# Patient Record
Sex: MALE | Race: WHITE | NOT HISPANIC OR LATINO | Employment: FULL TIME | URBAN - METROPOLITAN AREA
[De-identification: names, ages, dates, MRNs, and addresses within clinical notes are randomized per-mention and may not be internally consistent; named-entity substitution may affect disease eponyms.]

---

## 2024-05-22 ENCOUNTER — TELEPHONE (OUTPATIENT)
Dept: BEHAVIORAL HEALTH | Facility: CLINIC | Age: 39
End: 2024-05-22

## 2024-05-22 ENCOUNTER — HOSPITAL ENCOUNTER (INPATIENT)
Facility: CLINIC | Age: 39
LOS: 2 days | Discharge: HOME | End: 2024-05-25
Attending: PSYCHIATRY & NEUROLOGY | Admitting: PSYCHIATRY & NEUROLOGY
Payer: COMMERCIAL

## 2024-05-22 DIAGNOSIS — F33.1 MAJOR DEPRESSIVE DISORDER, RECURRENT EPISODE, MODERATE (H): ICD-10-CM

## 2024-05-22 DIAGNOSIS — I15.9 SECONDARY HYPERTENSION: ICD-10-CM

## 2024-05-22 DIAGNOSIS — F10.929 ALCOHOLIC INTOXICATION WITH COMPLICATION (H): ICD-10-CM

## 2024-05-22 DIAGNOSIS — F10.90 ALCOHOL USE DISORDER: ICD-10-CM

## 2024-05-22 DIAGNOSIS — R45.851 SUICIDAL IDEATION: Primary | ICD-10-CM

## 2024-05-22 DIAGNOSIS — F41.9 ANXIETY: ICD-10-CM

## 2024-05-22 LAB
ALBUMIN SERPL BCG-MCNC: 4.9 G/DL (ref 3.5–5.2)
ALCOHOL BREATH TEST: 0.27 (ref 0–0.01)
ALP SERPL-CCNC: 98 U/L (ref 40–150)
ALT SERPL W P-5'-P-CCNC: 28 U/L (ref 0–70)
AMPHETAMINES UR QL SCN: NORMAL
ANION GAP SERPL CALCULATED.3IONS-SCNC: 29 MMOL/L (ref 7–15)
AST SERPL W P-5'-P-CCNC: 34 U/L (ref 0–45)
BARBITURATES UR QL SCN: NORMAL
BASOPHILS # BLD AUTO: 0 10E3/UL (ref 0–0.2)
BASOPHILS NFR BLD AUTO: 0 %
BENZODIAZ UR QL SCN: NORMAL
BILIRUB SERPL-MCNC: 1.3 MG/DL
BUN SERPL-MCNC: 15.5 MG/DL (ref 6–20)
BZE UR QL SCN: NORMAL
CALCIUM SERPL-MCNC: 8.6 MG/DL (ref 8.6–10)
CANNABINOIDS UR QL SCN: NORMAL
CHLORIDE SERPL-SCNC: 92 MMOL/L (ref 98–107)
CREAT SERPL-MCNC: 0.75 MG/DL (ref 0.67–1.17)
DEPRECATED HCO3 PLAS-SCNC: 16 MMOL/L (ref 22–29)
EGFRCR SERPLBLD CKD-EPI 2021: >90 ML/MIN/1.73M2
EOSINOPHIL # BLD AUTO: 0 10E3/UL (ref 0–0.7)
EOSINOPHIL NFR BLD AUTO: 0 %
ERYTHROCYTE [DISTWIDTH] IN BLOOD BY AUTOMATED COUNT: 14.2 % (ref 10–15)
FENTANYL UR QL: NORMAL
GLUCOSE SERPL-MCNC: 70 MG/DL (ref 70–99)
HCT VFR BLD AUTO: 48 % (ref 40–53)
HGB BLD-MCNC: 17.3 G/DL (ref 13.3–17.7)
IMM GRANULOCYTES # BLD: 0 10E3/UL
IMM GRANULOCYTES NFR BLD: 0 %
LYMPHOCYTES # BLD AUTO: 2.4 10E3/UL (ref 0.8–5.3)
LYMPHOCYTES NFR BLD AUTO: 26 %
MAGNESIUM SERPL-MCNC: 2.1 MG/DL (ref 1.7–2.3)
MCH RBC QN AUTO: 30 PG (ref 26.5–33)
MCHC RBC AUTO-ENTMCNC: 36 G/DL (ref 31.5–36.5)
MCV RBC AUTO: 83 FL (ref 78–100)
MONOCYTES # BLD AUTO: 0.5 10E3/UL (ref 0–1.3)
MONOCYTES NFR BLD AUTO: 6 %
NEUTROPHILS # BLD AUTO: 6.3 10E3/UL (ref 1.6–8.3)
NEUTROPHILS NFR BLD AUTO: 68 %
NRBC # BLD AUTO: 0 10E3/UL
NRBC BLD AUTO-RTO: 0 /100
OPIATES UR QL SCN: NORMAL
PCP QUAL URINE (ROCHE): NORMAL
PHOSPHATE SERPL-MCNC: 3.5 MG/DL (ref 2.5–4.5)
PLATELET # BLD AUTO: 263 10E3/UL (ref 150–450)
POTASSIUM SERPL-SCNC: 4.1 MMOL/L (ref 3.4–5.3)
PROT SERPL-MCNC: 8.4 G/DL (ref 6.4–8.3)
RBC # BLD AUTO: 5.77 10E6/UL (ref 4.4–5.9)
SARS-COV-2 RNA RESP QL NAA+PROBE: NEGATIVE
SODIUM SERPL-SCNC: 137 MMOL/L (ref 135–145)
WBC # BLD AUTO: 9.2 10E3/UL (ref 4–11)

## 2024-05-22 PROCEDURE — 83735 ASSAY OF MAGNESIUM: CPT | Performed by: PSYCHIATRY & NEUROLOGY

## 2024-05-22 PROCEDURE — 84100 ASSAY OF PHOSPHORUS: CPT | Performed by: PSYCHIATRY & NEUROLOGY

## 2024-05-22 PROCEDURE — 85025 COMPLETE CBC W/AUTO DIFF WBC: CPT | Performed by: PSYCHIATRY & NEUROLOGY

## 2024-05-22 PROCEDURE — HZ2ZZZZ DETOXIFICATION SERVICES FOR SUBSTANCE ABUSE TREATMENT: ICD-10-PCS | Performed by: NURSE PRACTITIONER

## 2024-05-22 PROCEDURE — 84460 ALANINE AMINO (ALT) (SGPT): CPT | Performed by: PSYCHIATRY & NEUROLOGY

## 2024-05-22 PROCEDURE — 250N000013 HC RX MED GY IP 250 OP 250 PS 637: Performed by: PSYCHIATRY & NEUROLOGY

## 2024-05-22 PROCEDURE — 80307 DRUG TEST PRSMV CHEM ANLYZR: CPT | Performed by: PSYCHIATRY & NEUROLOGY

## 2024-05-22 PROCEDURE — 82374 ASSAY BLOOD CARBON DIOXIDE: CPT | Performed by: PSYCHIATRY & NEUROLOGY

## 2024-05-22 PROCEDURE — 36415 COLL VENOUS BLD VENIPUNCTURE: CPT | Performed by: PSYCHIATRY & NEUROLOGY

## 2024-05-22 PROCEDURE — 82075 ASSAY OF BREATH ETHANOL: CPT | Performed by: PSYCHIATRY & NEUROLOGY

## 2024-05-22 PROCEDURE — 87635 SARS-COV-2 COVID-19 AMP PRB: CPT | Performed by: PSYCHIATRY & NEUROLOGY

## 2024-05-22 PROCEDURE — 99285 EMERGENCY DEPT VISIT HI MDM: CPT | Performed by: PSYCHIATRY & NEUROLOGY

## 2024-05-22 RX ORDER — MULTIPLE VITAMINS W/ MINERALS TAB 9MG-400MCG
1 TAB ORAL DAILY
Status: ON HOLD | COMMUNITY
End: 2024-05-24

## 2024-05-22 RX ORDER — LANOLIN ALCOHOL/MO/W.PET/CERES
100 CREAM (GRAM) TOPICAL DAILY
Status: ON HOLD | COMMUNITY
End: 2024-05-24

## 2024-05-22 RX ORDER — ARIPIPRAZOLE 2 MG/1
2 TABLET ORAL DAILY
Status: DISCONTINUED | OUTPATIENT
Start: 2024-05-22 | End: 2024-05-23

## 2024-05-22 RX ORDER — GABAPENTIN 300 MG/1
300 CAPSULE ORAL 3 TIMES DAILY PRN
Status: DISCONTINUED | OUTPATIENT
Start: 2024-05-22 | End: 2024-05-25 | Stop reason: HOSPADM

## 2024-05-22 RX ORDER — ATENOLOL 50 MG/1
50 TABLET ORAL DAILY PRN
Status: DISCONTINUED | OUTPATIENT
Start: 2024-05-22 | End: 2024-05-23

## 2024-05-22 RX ORDER — LISINOPRIL 5 MG/1
15 TABLET ORAL DAILY
Status: ON HOLD | COMMUNITY
Start: 2024-02-16 | End: 2024-05-24

## 2024-05-22 RX ORDER — FOLIC ACID 1 MG/1
1 TABLET ORAL DAILY
Status: DISCONTINUED | OUTPATIENT
Start: 2024-05-22 | End: 2024-05-23

## 2024-05-22 RX ORDER — SERTRALINE HYDROCHLORIDE 25 MG/1
25 TABLET, FILM COATED ORAL DAILY
Status: DISCONTINUED | OUTPATIENT
Start: 2024-05-22 | End: 2024-05-25 | Stop reason: HOSPADM

## 2024-05-22 RX ORDER — ACETAMINOPHEN 325 MG/1
650 TABLET ORAL EVERY 8 HOURS PRN
Status: DISCONTINUED | OUTPATIENT
Start: 2024-05-22 | End: 2024-05-23

## 2024-05-22 RX ORDER — MULTIPLE VITAMINS W/ MINERALS TAB 9MG-400MCG
1 TAB ORAL DAILY
Status: DISCONTINUED | OUTPATIENT
Start: 2024-05-22 | End: 2024-05-23

## 2024-05-22 RX ORDER — SERTRALINE HYDROCHLORIDE 25 MG/1
25 TABLET, FILM COATED ORAL DAILY
Status: ON HOLD | COMMUNITY
Start: 2024-02-16 | End: 2024-05-24

## 2024-05-22 RX ORDER — LANOLIN ALCOHOL/MO/W.PET/CERES
3 CREAM (GRAM) TOPICAL
Status: DISCONTINUED | OUTPATIENT
Start: 2024-05-22 | End: 2024-05-25 | Stop reason: HOSPADM

## 2024-05-22 RX ORDER — ARIPIPRAZOLE 2 MG/1
1 TABLET ORAL DAILY
Status: ON HOLD | COMMUNITY
Start: 2024-02-16 | End: 2024-05-24

## 2024-05-22 RX ORDER — LANOLIN ALCOHOL/MO/W.PET/CERES
6 CREAM (GRAM) TOPICAL AT BEDTIME
COMMUNITY
Start: 2024-02-16

## 2024-05-22 RX ORDER — HYDROXYZINE PAMOATE 50 MG/1
50 CAPSULE ORAL 3 TIMES DAILY PRN
Status: ON HOLD | COMMUNITY
Start: 2024-02-16 | End: 2024-05-24

## 2024-05-22 RX ORDER — HYDROXYZINE HYDROCHLORIDE 25 MG/1
50 TABLET, FILM COATED ORAL 3 TIMES DAILY PRN
Status: DISCONTINUED | OUTPATIENT
Start: 2024-05-22 | End: 2024-05-23

## 2024-05-22 RX ORDER — LORAZEPAM 1 MG/1
1-4 TABLET ORAL EVERY 30 MIN PRN
Status: DISCONTINUED | OUTPATIENT
Start: 2024-05-22 | End: 2024-05-23

## 2024-05-22 RX ADMIN — LORAZEPAM 2 MG: 1 TABLET ORAL at 17:14

## 2024-05-22 RX ADMIN — LORAZEPAM 2 MG: 1 TABLET ORAL at 19:11

## 2024-05-22 RX ADMIN — SERTRALINE HYDROCHLORIDE 25 MG: 25 TABLET ORAL at 17:59

## 2024-05-22 RX ADMIN — THIAMINE HCL TAB 100 MG 100 MG: 100 TAB at 17:59

## 2024-05-22 RX ADMIN — LISINOPRIL 15 MG: 10 TABLET ORAL at 17:59

## 2024-05-22 RX ADMIN — LORAZEPAM 2 MG: 1 TABLET ORAL at 21:12

## 2024-05-22 RX ADMIN — Medication 1 TABLET: at 17:59

## 2024-05-22 RX ADMIN — ARIPIPRAZOLE 2 MG: 2 TABLET ORAL at 17:59

## 2024-05-22 RX ADMIN — NICOTINE POLACRILEX 4 MG: 4 GUM, CHEWING BUCCAL at 18:21

## 2024-05-22 RX ADMIN — FOLIC ACID 1 MG: 1 TABLET ORAL at 17:59

## 2024-05-22 RX ADMIN — ATENOLOL 50 MG: 50 TABLET ORAL at 20:06

## 2024-05-22 ASSESSMENT — COLUMBIA-SUICIDE SEVERITY RATING SCALE - C-SSRS
3. HAVE YOU BEEN THINKING ABOUT HOW YOU MIGHT KILL YOURSELF?: NO
4. HAVE YOU HAD THESE THOUGHTS AND HAD SOME INTENTION OF ACTING ON THEM?: NO
5. HAVE YOU STARTED TO WORK OUT OR WORKED OUT THE DETAILS OF HOW TO KILL YOURSELF? DO YOU INTEND TO CARRY OUT THIS PLAN?: NO
2. HAVE YOU ACTUALLY HAD ANY THOUGHTS OF KILLING YOURSELF IN THE PAST MONTH?: YES
1. IN THE PAST MONTH, HAVE YOU WISHED YOU WERE DEAD OR WISHED YOU COULD GO TO SLEEP AND NOT WAKE UP?: YES
6. HAVE YOU EVER DONE ANYTHING, STARTED TO DO ANYTHING, OR PREPARED TO DO ANYTHING TO END YOUR LIFE?: YES

## 2024-05-22 ASSESSMENT — ACTIVITIES OF DAILY LIVING (ADL)
ADLS_ACUITY_SCORE: 35

## 2024-05-22 NOTE — TELEPHONE ENCOUNTER
S: [facility] , Provider Sav  calling at 5:25 PM  with clinical on a 39 year old/Male presenting for alcohol detox.     B: Pt presents for ETOH detox.   Currently reports drinking 1 liter of vodka daily  for  a Week .    Patient reports last use was 6 hours ago .  Pt JAMES: .27  Pt  denies hx of DT  Pt  denies hx of seizures. Last seizure: N/A  Pt endorsing the following symptoms of withdrawal: Anxious and Nausea  MSSA Score: Unknown     Pt denies acute mental health or medical concerns.   Pt denies other drug use: None Amount/frequency: N/A    Does Pt have a detox care plan in [facility]? No   Does pt present with specific needs, assistive devices, or exclusionary criteria? None  Is the patient ambulating, eating and drinking in the ED? Yes     A: Pt meets criteria to be presented for IP detox admission. Patient is voluntary    COVID Symptoms: No  If yes, COVID test required   Utox: Negative  Magnesium: WNL  CMP: WNL  CBC: WNL  HCG: N/A     R: Patient cleared and ready for behavioral bed placement: Yes    Pt is meeting criteria for presentation to 3A/CD    Does Patient need a Transfer Center request created? No, Pt is located within [facility] ED, [facility] ED, or [place] [facility]

## 2024-05-22 NOTE — PHARMACY-ADMISSION MEDICATION HISTORY
Admission Medication History Completed by Pharmacy    See [facility] Admission Navigator for allergy information, preferred outpatient pharmacy and prior to admission medications.     Medication History Sources:   2/16/2024 Telemedicine note from [facility]     Additional Information:  PTA medication list updated based on 2/16/2024 telemedicine note and patient interview. All medication directions consistent with fill records and all medications refilled in February for 90-day supply.   Patient states he has not taken any of his medications for the past few months.    Prior to Admission medications    Medication Sig Last Dose       ARIPiprazole (ABILIFY) 2 MG tablet     Take 1 tablet by mouth daily More than a month       hydrOXYzine (VISTARIL) 50 MG capsule Take 50 mg by mouth 3 times daily as needed for anxiety     More than a month       lisinopril (ZESTRIL) 5 MG tablet Take 15 mg by mouth daily     More than a month       melatonin 3 MG tablet Take 6 mg by mouth at bedtime     More than a month       multivitamin w/minerals (THERA-VIT-M) tablet     Take 1 tablet by mouth daily More than a month       nicotine polacrilex (NICORETTE) 4 MG gum Take 4 mg by mouth every hour as needed     More than a month       sertraline (ZOLOFT) 25 MG tablet Take 25 mg by mouth daily     More than a month       thiamine (B-1) 100 MG tablet Take 100 mg by mouth daily More than a month             Date completed: 05/22/24    Medication history completed by:   Meaghan Amin, Pharm.D., [facility]  Behavioral Health Inpatient Pharmacist  [facility] ([facility]) Emergency Department  Contact via Vocera or Teams

## 2024-05-22 NOTE — PROGRESS NOTES
RN ED Mental Health Handoff Note    Patient pleasant and cooperative upon approach. Pt stated he wants detox.     Voluntary    Does patient require 1:1? No    Hold and rights been given and documented for patient: Yes    Is the patient in  scrubs? Yes    Has the patient been searched? Yes    Is the 15 minute observation tool up to date? Yes    Room check completed this shift: No    PSS3 and [place] Assessment/Reassessment this shift:    C-SSRS ([place])      Date and Time Q1 Wished to be Dead (Past Month) Q2 Suicidal Thoughts (Past Month) Q3 Suicidal Thought Method Q4 Suicidal Intent without Specific Plan Q5 Suicide Intent with Specific Plan Q6 Suicide Behavior (Lifetime) Within the Past 3 Months? Level of Risk per Screen Level of Risk per Screen User   05/22/24 1721 1-->yes 1-->yes 0-->no 0-->no 0-->no 0-->no -- -- low risk RC   05/22/24 1602 -- -- 0-->no 0-->no 0-->no -- 0-->no -- moderate risk RC   05/22/24 1513 1-->yes 1-->yes 0-->no 0-->no 0-->no 1-->yes 0-->no -- moderate risk RC          Code 21 called this shift? No    Use of restraints/seclusion this shift? No    Most recent vital signs:  Temp: 98.9  F (37.2  C) Temp src: Oral BP: (!) 149/105 Pulse: 105   Resp: 18 SpO2: 94 % O2 Device: None (Room air)      Medications:  Scheduled medication compliance? Yes    PRN Meds administered this shift? Yes    Medications   nicotine polacrilex (NICORETTE) gum 4 mg (4 mg Buccal $Given 5/22/24 1821)   sertraline (ZOLOFT) tablet 25 mg (25 mg Oral $Given 5/22/24 1759)   ARIPiprazole (ABILIFY) tablet 2 mg (2 mg Oral $Given 5/22/24 1759)   hydrOXYzine HCl (ATARAX) tablet 50 mg (has no administration in time range)   melatonin tablet 3 mg (has no administration in time range)   lisinopril (ZESTRIL) tablet 15 mg (15 mg Oral $Given 5/22/24 1759)   gabapentin (NEURONTIN) capsule 300 mg (has no administration in time range)   acetaminophen (TYLENOL) tablet 650 mg (has no administration in time range)   LORazepam (ATIVAN)  tablet 1-4 mg (2 mg Oral $Given 5/22/24 7131)   atenolol (TENORMIN) tablet 50 mg (has no administration in time range)   thiamine (B-1) tablet 100 mg (100 mg Oral $Given 5/22/24 1759)   folic acid (FOLVITE) tablet 1 mg (1 mg Oral $Given 5/22/24 1759)   multivitamin w/minerals (THERA-VIT-M) tablet 1 tablet (1 tablet Oral $Given 5/22/24 1759)     ADLs    Meal Provided this shift? Yes    Hygiene items provided? Yes    ADLs completed? Yes      Any significant events this shift? No    Any information that would be helpful in caring for this patient?  NA    Family present/updated? No    Location of patient's belongings: with patient    Critical Care Minutes:  Does the patient need critical care minutes documented? No

## 2024-05-22 NOTE — ED PROVIDER NOTES
"ED Provider Note  [facility]      History     Chief Complaint   Patient presents with    Suicidal     Pt stated he is feeling suicidal. Pt stated he does not have any plans. Pt stated that he drinks over 1 L daily. Pt stated he does not feel he good be safe outside of the hospital.     HPI  Raghavendra Loredo is a 39 year old male with a history of borderline personality disorder, CASSIUS, MDD, and SI presents to the emergency department for SI without a plan. Patient reports relapsing on alcohol past week and been drinking 1 L vodka daily with last drink 6 hours prior to arrival. Concerned friend called 911 as patient was feeling depressed and suicidal. Here he feels the alcohol is making his depression worse and that he no longer is suicidal as he samanta up. He is interested in detox. He was last in detox and treatment 6 months ago. He managed to stay sober until past week. He denies using other drugs. He is prescribed psychotropics for his depression and anxiety but has not been taking them since his week long alcohol binge.    Patient denies acute medical concerns. He denies history of withdrawal seizures or DTs.    Past Medical History  No past medical history on file.  No past surgical history on file.  ARIPiprazole (ABILIFY) 2 MG tablet  hydrOXYzine (VISTARIL) 50 MG capsule  lisinopril (ZESTRIL) 5 MG tablet  melatonin 3 MG tablet  nicotine polacrilex (NICORETTE) 4 MG gum  sertraline (ZOLOFT) 25 MG tablet      Allergies   Allergen Reactions    Shiitake Mushroom Hives     Family History  No family history on file.  Social History          A medically appropriate review of systems was performed with pertinent positives and negatives noted in the HPI, and all other systems negative.    Physical Exam   BP: (!) 149/105  Pulse: 105  Temp: 98.9  F (37.2  C)  Resp: 18  Height: 172.7 cm (5' 8\")  SpO2: 94 %  Physical Exam  Vitals and nursing note reviewed.   HENT:      Head: Normocephalic.   Eyes:      " Pupils: Pupils are equal, round, and reactive to light.   Pulmonary:      Effort: Pulmonary effort is normal.   Musculoskeletal:         General: Normal range of motion.      Cervical back: Normal range of motion.   Neurological:      General: No focal deficit present.      Mental Status: He is alert.   Psychiatric:         Attention and Perception: Attention and perception normal.         Mood and Affect: Mood and affect normal.         Speech: Speech normal.         Behavior: Behavior normal. Behavior is not agitated, aggressive, hyperactive or combative. Behavior is cooperative.         Thought Content: Thought content normal. Thought content is not paranoid or delusional. Thought content does not include homicidal or suicidal ideation.         Cognition and Memory: Cognition and memory normal.         Judgment: Judgment normal.           ED Course, Procedures, & Data      Procedures       10 minutes spent reviewing prior records and intervention.     Results for orders placed or performed during the hospital encounter of 05/22/24   Comprehensive metabolic panel     Status: Abnormal   Result Value Ref Range    Sodium 137 135 - 145 mmol/L    Potassium 4.1 3.4 - 5.3 mmol/L    Carbon Dioxide (CO2) 16 (L) 22 - 29 mmol/L    Anion Gap 29 (H) 7 - 15 mmol/L    Urea Nitrogen 15.5 6.0 - 20.0 mg/dL    Creatinine 0.75 0.67 - 1.17 mg/dL    GFR Estimate >90 >60 mL/min/1.73m2    Calcium 8.6 8.6 - 10.0 mg/dL    Chloride 92 (L) 98 - 107 mmol/L    Glucose 70 70 - 99 mg/dL    Alkaline Phosphatase 98 40 - 150 U/L    AST 34 0 - 45 U/L    ALT 28 0 - 70 U/L    Protein Total 8.4 (H) 6.4 - 8.3 g/dL    Albumin 4.9 3.5 - 5.2 g/dL    Bilirubin Total 1.3 (H) <=1.2 mg/dL   Magnesium     Status: Normal   Result Value Ref Range    Magnesium 2.1 1.7 - 2.3 mg/dL   Phosphorus     Status: Normal   Result Value Ref Range    Phosphorus 3.5 2.5 - 4.5 mg/dL   CBC with platelets and differential     Status: None   Result Value Ref Range    WBC Count 9.2  4.0 - 11.0 10e3/uL    RBC Count 5.77 4.40 - 5.90 10e6/uL    Hemoglobin 17.3 13.3 - 17.7 g/dL    Hematocrit 48.0 40.0 - 53.0 %    MCV 83 78 - 100 fL    MCH 30.0 26.5 - 33.0 pg    MCHC 36.0 31.5 - 36.5 g/dL    RDW 14.2 10.0 - 15.0 %    Platelet Count 263 150 - 450 10e3/uL    % Neutrophils 68 %    % Lymphocytes 26 %    % Monocytes 6 %    % Eosinophils 0 %    % Basophils 0 %    % Immature Granulocytes 0 %    NRBCs per 100 WBC 0 <1 /100    Absolute Neutrophils 6.3 1.6 - 8.3 10e3/uL    Absolute Lymphocytes 2.4 0.8 - 5.3 10e3/uL    Absolute Monocytes 0.5 0.0 - 1.3 10e3/uL    Absolute Eosinophils 0.0 0.0 - 0.7 10e3/uL    Absolute Basophils 0.0 0.0 - 0.2 10e3/uL    Absolute Immature Granulocytes 0.0 <=0.4 10e3/uL    Absolute NRBCs 0.0 10e3/uL   Urine Drug Screen Panel     Status: Normal   Result Value Ref Range    Amphetamines Urine Screen Negative Screen Negative    Barbituates Urine Screen Negative Screen Negative    Benzodiazepine Urine Screen Negative Screen Negative    Cannabinoids Urine Screen Negative Screen Negative    Cocaine Urine Screen Negative Screen Negative    Fentanyl Qual Urine Screen Negative Screen Negative    Opiates Urine Screen Negative Screen Negative    PCP Urine Screen Negative Screen Negative   Alcohol breath test POCT     Status: Abnormal   Result Value Ref Range    Alcohol Breath Test 0.269 (A) 0.00 - 0.01   CBC with platelets differential     Status: None    Narrative    The following orders were created for panel order CBC with platelets differential.  Procedure                               Abnormality         Status                     ---------                               -----------         ------                     CBC with platelets and d...[[id number]]                      Final result                 Please view results for these tests on the individual orders.   Urine Drug Screen     Status: Normal    Narrative    The following orders were created for panel order Urine Drug  Screen.  Procedure                               Abnormality         Status                     ---------                               -----------         ------                     Urine Drug Screen Panel[[id number]]      Normal              Final result                 Please view results for these tests on the individual orders.     Medications   nicotine polacrilex (NICORETTE) gum 4 mg (has no administration in time range)   sertraline (ZOLOFT) tablet 25 mg (has no administration in time range)   ARIPiprazole (ABILIFY) tablet 2 mg (has no administration in time range)   hydrOXYzine drew (VISTARIL) capsule 50 mg (has no administration in time range)   melatonin tablet 3 mg (has no administration in time range)   lisinopril (ZESTRIL) tablet 15 mg (has no administration in time range)   gabapentin (NEURONTIN) capsule 300 mg (has no administration in time range)   acetaminophen (TYLENOL) tablet 650 mg (has no administration in time range)   LORazepam (ATIVAN) tablet 1-4 mg (has no administration in time range)   atenolol (TENORMIN) tablet 50 mg (has no administration in time range)   thiamine (B-1) tablet 100 mg (has no administration in time range)   folic acid (FOLVITE) tablet 1 mg (has no administration in time range)   multivitamin w/minerals (THERA-VIT-M) tablet 1 tablet (has no administration in time range)     Labs Ordered and Resulted from Time of ED Arrival to Time of ED Departure   COMPREHENSIVE METABOLIC PANEL - Abnormal       Result Value    Sodium 137      Potassium 4.1      Carbon Dioxide (CO2) 16 (*)     Anion Gap 29 (*)     Urea Nitrogen 15.5      Creatinine 0.75      GFR Estimate >90      Calcium 8.6      Chloride 92 (*)     Glucose 70      Alkaline Phosphatase 98      AST 34      ALT 28      Protein Total 8.4 (*)     Albumin 4.9      Bilirubin Total 1.3 (*)    ALCOHOL BREATH TEST POCT - Abnormal    Alcohol Breath Test 0.269 (*)    MAGNESIUM - Normal    Magnesium 2.1     PHOSPHORUS - Normal     Phosphorus 3.5     URINE DRUG SCREEN PANEL - Normal    Amphetamines Urine Screen Negative      Barbituates Urine Screen Negative      Benzodiazepine Urine Screen Negative      Cannabinoids Urine Screen Negative      Cocaine Urine Screen Negative      Fentanyl Qual Urine Screen Negative      Opiates Urine Screen Negative      PCP Urine Screen Negative     CBC WITH PLATELETS AND DIFFERENTIAL    WBC Count 9.2      RBC Count 5.77      Hemoglobin 17.3      Hematocrit 48.0      MCV 83      MCH 30.0      MCHC 36.0      RDW 14.2      Platelet Count 263      % Neutrophils 68      % Lymphocytes 26      % Monocytes 6      % Eosinophils 0      % Basophils 0      % Immature Granulocytes 0      NRBCs per 100 WBC 0      Absolute Neutrophils 6.3      Absolute Lymphocytes 2.4      Absolute Monocytes 0.5      Absolute Eosinophils 0.0      Absolute Basophils 0.0      Absolute Immature Granulocytes 0.0      Absolute NRBCs 0.0       No orders to display          Critical care was not performed.     Medical Decision Making  The patient's presentation was of high complexity (a chronic illness severe exacerbation, progression, or side effect of treatment).    The patient's evaluation involved:  review of external note(s) from 2 sources (see separate area of note for details)  review of 2 test result(s) ordered prior to this encounter (see separate area of note for details)  ordering and/or review of 3+ test(s) in this encounter (see separate area of note for details)    The patient's management necessitated high risk (a decision regarding hospitalization).    Assessment & Plan    Patient is here via EMS from his apartment as he told a friend about feeling depressed and suicidal. Patient has been drinking 1 L vodka daily past week and admits that it has made him more depressed. He no longer feels suicidal as he is sobering up. He feels he is starting to go into withdrawal and would like to get into detox. Patient is medically cleared. Labs  are nonconcerning. Patient is restarted on his prescribed meds and gabapentin is added to help with early withdrawal. MSSA was ordered.     I have reviewed the nursing notes. I have reviewed the findings, diagnosis, plan and need for follow up with the patient.    New Prescriptions    No medications on file       Final diagnoses:   Alcoholic intoxication with complication (H24)   Major depressive disorder, recurrent episode, moderate (H)       Tigre Ang MD  [facility] EMERGENCY DEPARTMENT  5/22/2024     Tigre Ang MD  05/22/24 2664

## 2024-05-22 NOTE — ED TRIAGE NOTES
Pt stated he is feeling suicidal. Pt stated he does not have any plans. Pt stated that he drinks over 1 L daily. Pt stated he does not feel he good be safe outside of the hospital. Pt states he wants detox.     Triage Assessment (Adult)       Row Name 05/22/24 1512          Triage Assessment    Airway WDL WDL        Respiratory WDL    Respiratory WDL WDL        Skin Circulation/Temperature WDL    Skin Circulation/Temperature WDL WDL        Cardiac WDL    Cardiac WDL WDL        Peripheral/Neurovascular WDL    Peripheral Neurovascular WDL WDL        Cognitive/Neuro/Behavioral WDL    Cognitive/Neuro/Behavioral WDL X;mood/behavior;motor response     Level of Consciousness alert     Arousal Level opens eyes spontaneously     Orientation oriented x 4     Speech clear     Mood/Behavior anxious;flat affect;hypoactive (quiet, withdrawn);sad;cooperative        Pupils (CN II)    Pupil PERRLA yes        Allie Coma Scale    Best Eye Response 4-->(E4) spontaneous     Best Motor Response 6-->(M6) obeys commands     Best Verbal Response 5-->(V5) oriented     Allie Coma Scale Score 15        Motor Response    Motor Response general motor response     General Motor Response tremors

## 2024-05-22 NOTE — TELEPHONE ENCOUNTER
R: 5:55 PM Dr Hoff accepts pt for 3A/[place]    Pt added to unit queue, unit called with disposition    ED updated with pt placement.

## 2024-05-23 PROBLEM — F10.90 ALCOHOL USE DISORDER: Status: ACTIVE | Noted: 2024-05-23

## 2024-05-23 LAB
ANION GAP SERPL CALCULATED.3IONS-SCNC: 14 MMOL/L (ref 7–15)
BUN SERPL-MCNC: 21.7 MG/DL (ref 6–20)
CALCIUM SERPL-MCNC: 8.8 MG/DL (ref 8.6–10)
CHLORIDE SERPL-SCNC: 96 MMOL/L (ref 98–107)
CHOLEST SERPL-MCNC: 262 MG/DL
CREAT SERPL-MCNC: 0.75 MG/DL (ref 0.67–1.17)
DEPRECATED HCO3 PLAS-SCNC: 25 MMOL/L (ref 22–29)
EGFRCR SERPLBLD CKD-EPI 2021: >90 ML/MIN/1.73M2
GGT SERPL-CCNC: 32 U/L (ref 8–61)
GLUCOSE SERPL-MCNC: 115 MG/DL (ref 70–99)
HDLC SERPL-MCNC: 59 MG/DL
LDLC SERPL CALC-MCNC: 167 MG/DL
NONHDLC SERPL-MCNC: 203 MG/DL
POTASSIUM SERPL-SCNC: 4.1 MMOL/L (ref 3.4–5.3)
SODIUM SERPL-SCNC: 135 MMOL/L (ref 135–145)
TRIGL SERPL-MCNC: 179 MG/DL
TSH SERPL DL<=0.005 MIU/L-ACNC: 3.12 UIU/ML (ref 0.3–4.2)

## 2024-05-23 PROCEDURE — 250N000013 HC RX MED GY IP 250 OP 250 PS 637: Performed by: NURSE PRACTITIONER

## 2024-05-23 PROCEDURE — 99223 1ST HOSP IP/OBS HIGH 75: CPT | Mod: AI | Performed by: NURSE PRACTITIONER

## 2024-05-23 PROCEDURE — 250N000013 HC RX MED GY IP 250 OP 250 PS 637: Performed by: PSYCHIATRY & NEUROLOGY

## 2024-05-23 PROCEDURE — 84443 ASSAY THYROID STIM HORMONE: CPT | Performed by: PSYCHIATRY & NEUROLOGY

## 2024-05-23 PROCEDURE — 80048 BASIC METABOLIC PNL TOTAL CA: CPT | Performed by: PHYSICIAN ASSISTANT

## 2024-05-23 PROCEDURE — 99253 IP/OBS CNSLTJ NEW/EST LOW 45: CPT | Performed by: PHYSICIAN ASSISTANT

## 2024-05-23 PROCEDURE — 82465 ASSAY BLD/SERUM CHOLESTEROL: CPT | Performed by: PSYCHIATRY & NEUROLOGY

## 2024-05-23 PROCEDURE — 82977 ASSAY OF GGT: CPT | Performed by: PSYCHIATRY & NEUROLOGY

## 2024-05-23 PROCEDURE — 128N000004 HC R&B CD ADULT

## 2024-05-23 PROCEDURE — 36415 COLL VENOUS BLD VENIPUNCTURE: CPT | Performed by: PSYCHIATRY & NEUROLOGY

## 2024-05-23 RX ORDER — HYDROXYZINE HYDROCHLORIDE 25 MG/1
25 TABLET, FILM COATED ORAL EVERY 4 HOURS PRN
Status: DISCONTINUED | OUTPATIENT
Start: 2024-05-23 | End: 2024-05-23

## 2024-05-23 RX ORDER — SENNOSIDES 8.6 MG
8.6 TABLET ORAL 2 TIMES DAILY PRN
Status: DISCONTINUED | OUTPATIENT
Start: 2024-05-23 | End: 2024-05-23

## 2024-05-23 RX ORDER — ONDANSETRON 4 MG/1
4 TABLET, ORALLY DISINTEGRATING ORAL EVERY 6 HOURS PRN
Status: DISCONTINUED | OUTPATIENT
Start: 2024-05-23 | End: 2024-05-25 | Stop reason: HOSPADM

## 2024-05-23 RX ORDER — ATENOLOL 50 MG/1
50 TABLET ORAL DAILY PRN
Status: DISCONTINUED | OUTPATIENT
Start: 2024-05-23 | End: 2024-05-25 | Stop reason: HOSPADM

## 2024-05-23 RX ORDER — HYDROXYZINE HYDROCHLORIDE 50 MG/1
50 TABLET, FILM COATED ORAL 3 TIMES DAILY PRN
Status: DISCONTINUED | OUTPATIENT
Start: 2024-05-23 | End: 2024-05-25 | Stop reason: HOSPADM

## 2024-05-23 RX ORDER — AMOXICILLIN 250 MG
1 CAPSULE ORAL 2 TIMES DAILY PRN
Status: DISCONTINUED | OUTPATIENT
Start: 2024-05-23 | End: 2024-05-25 | Stop reason: HOSPADM

## 2024-05-23 RX ORDER — ACETAMINOPHEN 325 MG/1
650 TABLET ORAL EVERY 4 HOURS PRN
Status: DISCONTINUED | OUTPATIENT
Start: 2024-05-23 | End: 2024-05-25 | Stop reason: HOSPADM

## 2024-05-23 RX ORDER — MULTIPLE VITAMINS W/ MINERALS TAB 9MG-400MCG
1 TAB ORAL DAILY
Status: DISCONTINUED | OUTPATIENT
Start: 2024-05-23 | End: 2024-05-25 | Stop reason: HOSPADM

## 2024-05-23 RX ORDER — TRAZODONE HYDROCHLORIDE 50 MG/1
50 TABLET, FILM COATED ORAL
Status: DISCONTINUED | OUTPATIENT
Start: 2024-05-23 | End: 2024-05-25 | Stop reason: HOSPADM

## 2024-05-23 RX ORDER — ARIPIPRAZOLE 2 MG/1
2 TABLET ORAL DAILY
Status: DISCONTINUED | OUTPATIENT
Start: 2024-05-23 | End: 2024-05-25 | Stop reason: HOSPADM

## 2024-05-23 RX ORDER — FOLIC ACID 1 MG/1
1 TABLET ORAL DAILY
Status: DISCONTINUED | OUTPATIENT
Start: 2024-05-23 | End: 2024-05-25 | Stop reason: HOSPADM

## 2024-05-23 RX ORDER — MAGNESIUM HYDROXIDE/ALUMINUM HYDROXICE/SIMETHICONE 120; 1200; 1200 MG/30ML; MG/30ML; MG/30ML
30 SUSPENSION ORAL EVERY 4 HOURS PRN
Status: DISCONTINUED | OUTPATIENT
Start: 2024-05-23 | End: 2024-05-25 | Stop reason: HOSPADM

## 2024-05-23 RX ORDER — DIAZEPAM 5 MG
5-20 TABLET ORAL EVERY 30 MIN PRN
Status: DISCONTINUED | OUTPATIENT
Start: 2024-05-23 | End: 2024-05-25 | Stop reason: HOSPADM

## 2024-05-23 RX ADMIN — NICOTINE POLACRILEX 4 MG: 4 GUM, CHEWING BUCCAL at 12:43

## 2024-05-23 RX ADMIN — THIAMINE HCL TAB 100 MG 100 MG: 100 TAB at 08:47

## 2024-05-23 RX ADMIN — NICOTINE POLACRILEX 4 MG: 4 GUM, CHEWING BUCCAL at 16:28

## 2024-05-23 RX ADMIN — LORAZEPAM 2 MG: 1 TABLET ORAL at 01:30

## 2024-05-23 RX ADMIN — NICOTINE POLACRILEX 4 MG: 4 GUM, CHEWING BUCCAL at 09:21

## 2024-05-23 RX ADMIN — Medication 5 MG: at 21:26

## 2024-05-23 RX ADMIN — Medication 3 MG: at 01:30

## 2024-05-23 RX ADMIN — ARIPIPRAZOLE 2 MG: 2 TABLET ORAL at 08:44

## 2024-05-23 RX ADMIN — SERTRALINE HYDROCHLORIDE 25 MG: 25 TABLET ORAL at 08:45

## 2024-05-23 RX ADMIN — NICOTINE POLACRILEX 4 MG: 4 GUM, CHEWING BUCCAL at 19:43

## 2024-05-23 RX ADMIN — Medication 1 TABLET: at 08:46

## 2024-05-23 RX ADMIN — NICOTINE POLACRILEX 4 MG: 4 GUM, CHEWING BUCCAL at 14:55

## 2024-05-23 RX ADMIN — NICOTINE POLACRILEX 4 MG: 4 GUM, CHEWING BUCCAL at 18:36

## 2024-05-23 RX ADMIN — LISINOPRIL 15 MG: 10 TABLET ORAL at 08:45

## 2024-05-23 RX ADMIN — GABAPENTIN 300 MG: 300 CAPSULE ORAL at 20:11

## 2024-05-23 RX ADMIN — FOLIC ACID 1 MG: 1 TABLET ORAL at 08:46

## 2024-05-23 RX ADMIN — NICOTINE POLACRILEX 4 MG: 4 GUM, CHEWING BUCCAL at 11:07

## 2024-05-23 RX ADMIN — SENNOSIDES AND DOCUSATE SODIUM 1 TABLET: 50; 8.6 TABLET ORAL at 06:43

## 2024-05-23 RX ADMIN — NICOTINE POLACRILEX 4 MG: 4 GUM, CHEWING BUCCAL at 21:26

## 2024-05-23 RX ADMIN — NICOTINE POLACRILEX 4 MG: 4 GUM, CHEWING BUCCAL at 17:34

## 2024-05-23 ASSESSMENT — ACTIVITIES OF DAILY LIVING (ADL)
LAUNDRY: UNABLE TO COMPLETE
ADLS_ACUITY_SCORE: 31
ADLS_ACUITY_SCORE: 31
ORAL_HYGIENE: WITH ASSISTANCE
ADLS_ACUITY_SCORE: 31
HYGIENE/GROOMING: INDEPENDENT
ADLS_ACUITY_SCORE: 31
ADLS_ACUITY_SCORE: 31
ORAL_HYGIENE: INDEPENDENT
ADLS_ACUITY_SCORE: 31
HYGIENE/GROOMING: INDEPENDENT
ADLS_ACUITY_SCORE: 31
DRESS: INDEPENDENT
ADLS_ACUITY_SCORE: 31
LAUNDRY: UNABLE TO COMPLETE
ADLS_ACUITY_SCORE: 31
DRESS: INDEPENDENT
ADLS_ACUITY_SCORE: 31
DRESS: INDEPENDENT
ADLS_ACUITY_SCORE: 45
ORAL_HYGIENE: INDEPENDENT
ADLS_ACUITY_SCORE: 31
HYGIENE/GROOMING: INDEPENDENT
ADLS_ACUITY_SCORE: 45

## 2024-05-23 ASSESSMENT — LIFESTYLE VARIABLES: SKIP TO QUESTIONS 9-10: 0

## 2024-05-23 NOTE — COMMUNITY RESOURCES LIST (ENGLISH)
May 23, 2024           YOUR PERSONALIZED LIST OF SERVICES & PROGRAMS           USE    Use Treatment      Psychological Services, [facility] - Outpatient substance use treatment  [address] [place] [address] (Distance: 0.3 miles)  Phone: [phone]  Website: http://[facility].com  Language: English  Fee: Insurance, Self pay  Accessibility: Ada accessible      [facility] Substance Use Disorder Programs ([place]ANIEL)  Phone: [phone]) [phone]  Website: https://Strevus/programs-services/addiction-treatment/  Language: English  Hours: Mon 8:00 AM - 5:00 PM Tue 8:00 AM - 5:00 PM Wed 8:00 AM - 5:00 PM Thu 8:00 AM - 5:00 PM Fri 8:00 AM - 5:00 PM  Fee: Insurance, Self pay  Accessibility: [place] accessible      [facility]  Phone: [phone]  Website: [email or web address]  Language: English  Hours: Mon 7:00 AM - 7:00 PM Tue 7:00 AM - 7:00 PM Wed 7:00 AM - 7:00 PM Thu 7:00 AM - 7:00 PM Fri 7:00 AM - 7:00 PM  Fee: Free    Use Recovery Support      [facility] Alcohol[facility] and [facility]  [address] [place] [address] (Distance: 0.3 miles)  Phone: [phone]) [phone]  Website: http://[email or web address].[facility]  Language: English  Fee: Free      in the [facility] Peer Support Network  Phone: (667) [phone]  Website: [email or web address]://[email or web address].org/vpsnhm  Language: English  Hours: Mon 9:00 AM - 5:00 PM Tue 9:00 AM - 5:00 PM Wed 9:00 AM - 5:00 PM Thu 9:00 AM - 5:00 PM Fri 9:00 AM - 5:00 PM  Fee: Free  Accessibility: Ada accessible, [facility] services      [facility]  Phone: [phone]) [phone]  Website: [email or web address]  Language: English  Hours: Mon 7:00 AM - 7:00 PM Tue 7:00 AM - 7:00 PM Wed 7:00 AM - 7:00 PM Thu 7:00 AM - 7:00 PM Fri 7:00 AM - 7:00 PM  Fee: Free               IMPORTANT NUMBERS & WEBSITES        Emergency Services  911  .   [facility]  211 [email or web address]  .   Poison Control  (004)  419-2086 http://[place]poison.org http://[place]poison.org  .     Suicide and Crisis Lifeline  988 http://988lifeline.org  .   Childhelp [place] Child Abuse Hotline  244.713.6406 http://Childhelphotline.org   .   [place] Sexual Assault Hotline  (762) 818-4767 (HOPE) http://Rainn.org   .     National Runaway Safeline  (527) 235-6311 (RUNAWAY) http://Versonics.boldUnderline. llc  .   Pregnancy & Postpartum Support  Call/text 294-666-2892  [place]: http://ppsupport[place].org  [place]: http://[employer].com/wi  .   Substance Abuse National Helpline ([facility])  393-858-HELP (6533) http://Findtreatment.gov   .                DISCLAIMER: These resources have been generated via the [employer] Platform. [employer] does not endorse any service providers mentioned in this resource list. [employer] does not guarantee that the services mentioned in this resource list will be available to you or will improve your health or wellness.    [facility]

## 2024-05-23 NOTE — H&P
[facility], [place]   Psychiatric History and Physical  Admission date: 5/22/2024      Chief Complaint:   Alcohol withdrawals.      HPI:   From ED: Raghavendra Loredo is a 39 year old male with a history of borderline personality disorder, CASSIUS, MDD, and SI presents to the emergency department for SI without a plan. Patient reports relapsing on alcohol past week and been drinking 1 L vodka daily with last drink 6 hours prior to arrival. Concerned friend called 911 as patient was feeling depressed and suicidal. Here he feels the alcohol is making his depression worse and that he no longer is suicidal as he samanta up. He is interested in detox. He was last in detox and treatment 6 months ago. He managed to stay sober until past week. He denies using other drugs. He is prescribed psychotropics for his depression and anxiety but has not been taking them since his week long alcohol binge.   Raghavendra Loredo is a 39 year old male with history of alcohol and tobacco use disorder, depression, anxiety, borderline personality disorder.  The patient is a good historian.  States the reason for admission is alcohol detox.  States.  States he is doing very well in his life until recently.  He was able to get his life back together.  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  He was in good terms with his girlfriend and their 3-year-old son.  He decided that he wants to celebrate success and relapsed after being sober for 4 months.  He started drinking about a week ago, approximately 1 L of vodka daily.  Usually starts drinking in the afternoon.  He drinks until he passes out.  The patient denies history of seizures and DTs.  The patient started drinking at age 22 and became a problem about 20 years ago.  His longest sobriety has been 6 or 7 years.  He has been in rehab 3 times.  The patient reports quit vaping tobacco about a month ago.  He is still using nicotine gum.  He has a history of abusing cannabis in the past.  He  "denies abusing any other substances.  Currently not interested in rehab because he needs to work.  He is interested in IOP program.  The patient reports history of depression, anxiety, and borderline personality disorder.  He reports stopping his medications about a month ago.  He was recently started on Abilify, melatonin, Zoloft.  Currently does not have a psychiatrist or therapist.  The patient feels depressed.  He is sleeping 2 to 3 hours a night.  He does not take naps during the day.  Energy is low.  Appetite is \"fairly good\".  Denies suicidal or homicidal ideation.  Feels hopeless and helpless.  Anxiety comes and goes.  He is having history of panic attacks but not recently.  He denies lashonda, psychosis, PTSD, OCD, and eating disorders.  The patient reports a history of borderline personality disorder.  He has attempted suicide 3 times the last one in January by drinking to death.  Denies SIB. Denies seizures, head injuries, and loss of consciousness.      Past Psychiatric History:   The patient reports history of depression, anxiety, borderline personality disorder.  He has been on Prozac, Zoloft, melatonin, and Abilify.  He has a history of noncompliance.  Reports 3 suicide attempts in the past, no SIB.  See HPI for details.  Currently does not have a psychiatrist or therapist.      Substance Use and History:   See HPI      Past Medical History:   PAST MEDICAL HISTORY: No past medical history on file.  PAST SURGICAL HISTORY: No past surgical history on file.      Family History:   FAMILY HISTORY: No family history on file.  Family history is positive for alcohol and drugs and depression in multiple family members.      Social History:   The patient is from the [facility].  He left and difficult for many years recently came back to the [facility].  His parents are alive.  He has siblings all over the [place].  The patient is single.  He has a 3-year-old son.  He works at the [email or web address] and [employer]. "  Highest education is some college.  Denies  history.  Legal history includes DUI several years ago.  He is not on probation.       Physical ROS:   The patient endorsed alcohol withdrawals. The remainder of 10-point review of systems was negative except as noted in HPI.       PTA Medications:     Medications Prior to Admission   Medication Sig Dispense Refill Last Dose    ARIPiprazole (ABILIFY) 2 MG tablet Take 1 tablet by mouth daily   Past Month    hydrOXYzine (VISTARIL) 50 MG capsule Take 50 mg by mouth 3 times daily as needed for anxiety   Past Month    lisinopril (ZESTRIL) 5 MG tablet Take 15 mg by mouth daily   Past Month    melatonin 3 MG tablet Take 6 mg by mouth at bedtime   Past Month    multivitamin w/minerals (THERA-VIT-M) tablet Take 1 tablet by mouth daily   Past Month    nicotine polacrilex (NICORETTE) 4 MG gum Take 4 mg by mouth every hour as needed   Past Month    sertraline (ZOLOFT) 25 MG tablet Take 25 mg by mouth daily   Past Month    thiamine (B-1) 100 MG tablet Take 100 mg by mouth daily   Past Month          Allergies:     Allergies   Allergen Reactions    Venaxis HivrateGenius          Labs:     Recent Results (from the past 48 hour(s))   Comprehensive metabolic panel    Collection Time: 05/22/24  3:53 PM   Result Value Ref Range    Sodium 137 135 - 145 mmol/L    Potassium 4.1 3.4 - 5.3 mmol/L    Carbon Dioxide (CO2) 16 (L) 22 - 29 mmol/L    Anion Gap 29 (H) 7 - 15 mmol/L    Urea Nitrogen 15.5 6.0 - 20.0 mg/dL    Creatinine 0.75 0.67 - 1.17 mg/dL    GFR Estimate >90 >60 mL/min/1.73m2    Calcium 8.6 8.6 - 10.0 mg/dL    Chloride 92 (L) 98 - 107 mmol/L    Glucose 70 70 - 99 mg/dL    Alkaline Phosphatase 98 40 - 150 U/L    AST 34 0 - 45 U/L    ALT 28 0 - 70 U/L    Protein Total 8.4 (H) 6.4 - 8.3 g/dL    Albumin 4.9 3.5 - 5.2 g/dL    Bilirubin Total 1.3 (H) <=1.2 mg/dL   Magnesium    Collection Time: 05/22/24  3:53 PM   Result Value Ref Range    Magnesium 2.1 1.7 - 2.3 mg/dL   Phosphorus     Collection Time: 05/22/24  3:53 PM   Result Value Ref Range    Phosphorus 3.5 2.5 - 4.5 mg/dL   CBC with platelets and differential    Collection Time: 05/22/24  3:53 PM   Result Value Ref Range    WBC Count 9.2 4.0 - 11.0 10e3/uL    RBC Count 5.77 4.40 - 5.90 10e6/uL    Hemoglobin 17.3 13.3 - 17.7 g/dL    Hematocrit 48.0 40.0 - 53.0 %    MCV 83 78 - 100 fL    MCH 30.0 26.5 - 33.0 pg    MCHC 36.0 31.5 - 36.5 g/dL    RDW 14.2 10.0 - 15.0 %    Platelet Count 263 150 - 450 10e3/uL    % Neutrophils 68 %    % Lymphocytes 26 %    % Monocytes 6 %    % Eosinophils 0 %    % Basophils 0 %    % Immature Granulocytes 0 %    NRBCs per 100 WBC 0 <1 /100    Absolute Neutrophils 6.3 1.6 - 8.3 10e3/uL    Absolute Lymphocytes 2.4 0.8 - 5.3 10e3/uL    Absolute Monocytes 0.5 0.0 - 1.3 10e3/uL    Absolute Eosinophils 0.0 0.0 - 0.7 10e3/uL    Absolute Basophils 0.0 0.0 - 0.2 10e3/uL    Absolute Immature Granulocytes 0.0 <=0.4 10e3/uL    Absolute NRBCs 0.0 10e3/uL   Alcohol breath test POCT    Collection Time: 05/22/24  3:57 PM   Result Value Ref Range    Alcohol Breath Test 0.269 (A) 0.00 - 0.01   Urine Drug Screen Panel    Collection Time: 05/22/24  4:12 PM   Result Value Ref Range    Amphetamines Urine Screen Negative Screen Negative    Barbituates Urine Screen Negative Screen Negative    Benzodiazepine Urine Screen Negative Screen Negative    Cannabinoids Urine Screen Negative Screen Negative    Cocaine Urine Screen Negative Screen Negative    Fentanyl Qual Urine Screen Negative Screen Negative    Opiates Urine Screen Negative Screen Negative    PCP Urine Screen Negative Screen Negative   Asymptomatic COVID-19 Virus (Coronavirus) by PCR Nasopharyngeal    Collection Time: 05/22/24  5:20 PM    Specimen: Nasopharyngeal; Swab   Result Value Ref Range    SARS CoV2 PCR Negative Negative          Physical and Psychiatric Examination:   /75 (BP Location: Left arm, Patient Position: Supine)   Pulse 99   Temp (!) 96  F (35.6  C)  "(Temporal)   Resp 16   Ht 1.727 m (5' 8\")   Wt 77.1 kg (170 lb)   SpO2 96%   BMI 25.85 kg/m    Weight is 170 lbs 0 oz  Body mass index is 25.85 kg/m .  Physical Exam:  I have reviewed the physical exam as documented by the medical team and agree with findings and assessment and have no additional findings to add at this time.  Mental Status Exam:  Appearance: adequately groomed and well groomed  Attitude:  cooperative  Eye Contact:  good  Mood:  anxious and depressed  Affect:  appropriate and in normal range  Speech:  clear, coherent  Language: fluent and intact in English  Psychomotor, Gait, Musculoskeletal:  no evidence of tardive dyskinesia, dystonia, or tics  Thought Process:  logical and goal oriented  Associations:  no loose associations  Thought Content:  no evidence of suicidal ideation or homicidal ideation, no auditory hallucinations present, and no visual hallucinations present  Insight:  fair  Judgement:  fair  Oriented to:  time, person, and place  Attention Span and Concentration:  intact  Recent and Remote Memory:  intact  Fund of Knowledge:  appropriate         Admission Diagnoses:   Alcohol use disorder, severe, dependence  Alcohol withdrawals with unspecified complications.  Cannabis use disorder in sustained remission  Tobacco use disorder  Major depressive disorder, recurrent episode, moderate (H)  Generalized anxiety disorder  Borderline personality disorder per history       Assessment & Plan:   Medications:  --MSSA with Valium, folic acid, multivitamins, thiamine for alcohol dependency  -- Restart Zoloft, 25 mg every morning for depression and anxiety  --Hold on Abilify until withdrawals are completed.  --Will discuss anticraving medication tomorrow.  --Additional medications include gabapentin, hydroxyzine, nicotine gum, Zofran, thousand.  Lab work:  Lab work was reviewed.  Abnormal results include chloride 96, urea nitrogen 21.7, bilirubin 1.3, protein 8.4, cholesterol 262, glucose 115, " ", non-, triglyceride 179.  U tox was negative.  Alcohol breathalyzer was 0.269.  Consults:   Internal medicine to follow up for medical problems.   Elevated bilirubin, mild  Elevated to 1.3 on admission. Remainder of LFTs wnl. No abdominal pain, nausea, vomiting. Per chart review, this has been noted previously outside the context of acute alcohol use. No history of Gilbert's noted per chart review, but because this has been noted previously, will recommend recheck as OP    -Repeat LFTs as OP  -Please notify medicine if patient develops any abdominal pain, nausea, vomiting   -Patient will need referral for internal medicine on discharge (placed for you)     HTN: PTA lisinopril 15mg daily (he will need a refill of this on discharge)  Nicotine use disorder: NRT     Resolved:   AGMA, resolved: likely in the setting of acute alcohol use.         The patient's care was discussed with the Bedside Nurse, Patient, and Primary team via this note.     Medicine will sign off at this time. Thank you for the consult. Please reach out to medicine if further questions arise.    # Anion Gap Metabolic Acidosis: Highest Anion Gap = 29 mmol/L in last 2 days, will monitor and treat as appropriate      # Hypertension: Home medication list includes antihypertensive(s)      # Overweight: Estimated body mass index is 25.85 kg/m  as calculated from the following:  Height as of this encounter: 1.727 m (5' 8\").  Weight as of this encounter: 77.1 kg (170 lb).        Araseli Ware PA-C  Hospitalist Service    Disposition Plan   Reason for ongoing admission: requires detoxification from substance that poses a risk of bodily harm during withdrawal period  Discharge location: home with self-care  Discharge Medications: not ordered  Follow-up Appointments: not scheduled  Legal Status: voluntary  Estimated length of stay: 2-3 days  Care was coordinated with the treatment team.  The patient was consulted on nature of illness and " treatment options.   Entered by: NICOLE Vaughn CNP on 5/23/2024 at 7:24 AM     This note was created with the help of Dragon dictation system. All grammatical/typing errors or context distortion are unintentional and inherent to software.

## 2024-05-23 NOTE — PROGRESS NOTES
"CLINICAL NUTRITION SERVICES - ASSESSMENT NOTE     Nutrition Prescription    RECOMMENDATIONS FOR MDs/PROVIDERS TO ORDER:  None at this time    Malnutrition Status:    Patient does not meet two of the established criteria necessary for diagnosing malnutrition but is at risk for malnutrition    Recommendations already ordered by Registered Dietitian (RD):  None at this time    Future/Additional Recommendations:  RD to sign off at this time, but will remain available by consult if new nutrition problem arises.       REASON FOR ASSESSMENT  Raghavendra Loredo is a/an 39 year old male assessed by the dietitian for Provider Order - New admit weight loss     CLINICAL HISTORY  PMH significant for borderline personality disorder, HTN, PTSD, CASSIUS, MDD, and SI. Admitted from ED 5/23/24 due to concern for SI without a plan and seeking detox from alcohol.     NUTRITION HISTORY  RD met with Raghavendra at bedside who reports unintentional wt loss over the past 6 months 2/2 increased exercise d/t having to walk for his commute and poor intake d/t food insecurity. Pt reports he has applied for SNAP benefits and is waiting for a response. Pt states appetite is continuing to improve since admission and nausea has resolved. Pt declined offer of additional snacks or portions.     GI: Pt denied N/V/D/C     CURRENT NUTRITION ORDERS  Diet: Regular  Supplement: none  Intake/Tolerance: eating and drinking adequately per pt    LABS  BUN 21.7H    MEDICATIONS  Abilify (held), folic acid, MVI-M, zoloft, thiamine, senokot-s prn    ANTHROPOMETRICS  Height: 172.7 cm (5' 8\")  Most Recent Weight: 77.1 kg (170 lb) (Stated)    IBW: 70 kg   BMI: Overweight BMI 25-29.9  Weight History:   Wt Readings from Last 15 Encounters:   05/23/24 77.1 kg (170 lb)   01/19/24 80.2 kg (176 lb 12.9 oz)      11/27/23 86.7 kg (191 lb 3.2 oz)      09/22/23 81 kg (178 lb 9.2 oz)      06/30/23 82 kg (180 lb 12.4 oz)      11% wt loss in 6 months    Dosing Weight: 77 kg (actual)    ASSESSED " NUTRITION NEEDS  Estimated Energy Needs: 1925 - 2310 kcals/day (25 - 30 kcals/kg)  Justification: Maintenance  Estimated Protein Needs: 61 - 77 grams protein/day (0.8 - 1 grams of pro/kg)  Justification: Maintenance  Estimated Fluid Needs: 1 mL/kcal  Justification: Maintenance or Per Provider    PHYSICAL FINDINGS  See malnutrition section below.  Parker: 21  No abnormal nutrition-related physical findings observed.     MALNUTRITION  % Intake: No decreased intake noted  % Weight Loss: > 10% in 6 months (severe)  Subcutaneous Fat Loss: None observed  Muscle Loss: None observed  Fluid Accumulation/Edema: None noted  Malnutrition Diagnosis: Patient does not meet two of the established criteria necessary for diagnosing malnutrition but is at risk for malnutrition    NUTRITION DIAGNOSIS  No nutrition diagnosis at this time    INTERVENTIONS  Implementation  Nutrition Education: RD role in care   Modify composition of meals/snacks - pt declined    Goals  Patient to consume % of nutritionally adequate meal trays TID, or the equivalent with supplements/snacks.     Monitoring/Evaluation  RD to sign off at this time, but will remain available by consult if new nutrition problem arises.      Amina Virgen, MPH, RDN, LD  Behavioral Health Adult & Pediatric Dietitian  Contact via Emergency CallWorks or Desk Phone: [phone]

## 2024-05-23 NOTE — PLAN OF CARE
Problem: Adult Inpatient Plan of Care  Goal: Optimal Comfort and Wellbeing  Outcome: Progressing   Goal Outcome Evaluation:    Plan of Care Reviewed With: patient        Pt endorsing low energy, withdrawn to self, depression, MSSA 5/2, vitals stable, pt denies pain, SI, plan, thought or intent, doris to safety. Reports that he is worried about housing with rent coming up, if he does go into inpatient treatment, Pt requested lozenges frequently this shift.

## 2024-05-23 NOTE — ED NOTES
Provider notified of HR, recheck in one hour from medication administration.  Called [facility] again to give report on patient but they are taking an admit at this time and unable to take patient now until night shift.

## 2024-05-23 NOTE — PLAN OF CARE
S: The Patient presented to room 316-2 at 0050 on 5/23/2024 for alcohol detox under [facility]'s care voluntarily. The Pt comes from [facility] ED as a CD Pt seeking detox from alcohol.    B: Based on the E.D. notes, RN-to-RN report, and the assessment interview, Raghavendra Loredo is a 39-year-old male with a history of borderline personality disorder, HTN, PTSD, CASSIUS, MDD, and SI who presents to the emergency department for SI without a plan, and alcohol detox. The patient reports relapsing on alcohol the past week and has been drinking 1 L vodka daily with the last drink 6 hours before arrival. A concerned friend called 911 as the patient was feeling depressed and suicidal. Here, he feels the alcohol is making his depression worse and that he is no longer suicidal as he samanta up. He is interested in detox. He was last in detox and treatment six months ago. He managed to stay sober until the past week. He denies using other drugs but would use marijuana occasionally. He has been prescribed psychotropics for his depression and anxiety but has not been taking them since his week-long alcohol binge.     JAMES 0.269  COVID: Neg  Utox: Neg  Magnesium: WNL  CMP: WNL  CBC: WNL  HCG: NA    A: The patient cooperated with the admission process and safety search, although he had difficulty remembering things. The patient denied SI, HI, AVHs, pain, and poor appetite, but he endorsed anxiety, depression, constipation, and sleeping difficulties. The patient lives in an apartment in  [facility] and works at [employer] in a [employer]. Raghavendra has a history of  hospitalization, detox, and Residential treatment. He started drinking at 23, which became a problem shortly after. He says he drinks, especially over the weekend, because of boredom.      R: In the morning, a team of medical professionals, including a psychiatrist, an internal medicine provider, and a CTC, will attend to the patient. The primary registered nurse will use  the [facility] Protocol to assess and use Valium to treat any symptoms associated with alcohol withdrawal. The Team will assist the patient in developing healthy coping skills, setting daily goals, adhering to medication, reporting any side effects, building rapport, and initiating the 15-minute safety checks.

## 2024-05-23 NOTE — DISCHARGE INSTRUCTIONS
Behavioral Discharge Planning and Instructions  THANK YOU FOR CHOOSING [facility]  3A  [phone]    Summary: You were admitted to [facility] on 5/22/24  for detoxification from alcohol .  A medical exam was performed that included lab work. You have met with a  and opted to discharge home.  Please take care and make your recovery a daily priority, Raghavendra!  It was a pleasure working with you and the entire treatment team here wishes you the very best in your recovery!     Recommendation: Return home and schedule to have your assessment completed at the facility you choose. Please reference the suggestions below if needed. Attend both your psychology  and therapy appointments.       [facility]   Phone: [phone]   Fax: [phone] (for both locations)  [place] location at [address] (second floor) [place] [address]  Groups are Mon-Thurs either 9am-12pm or 6pm-9pm, 1-3 days weekly depending on client needs     [facility] (writer confirmed they have hybrid program)  Many [facility] locations  Call [phone] to schedule assessment and discuss appropriate program     3R s [facility] [employer]  [address] [place] [address]  Phone: [phone]  Fax: [phone]  Email: [email or web address]     [address] [place] [address]  [address] [place] [address]  Phone: [phone]  Fax: [phone]  Email: [email or web address]       Main Diagnoses:  Per Marcelina Summers  Alcohol use disorder, severe, dependence  Alcohol withdrawals with unspecified complications.  Cannabis use disorder in sustained remission  Tobacco use disorder  Major depressive disorder, recurrent episode, moderate (H)  Generalized anxiety disorder  Borderline personality disorder per history      Major Treatments, Procedures and Findings:  You were treated for Alcohol  detoxification using Valium . As an outpatient you will  be prescribed ***, please take this medication as prescribed until it is gone. You did not complete a chemical dependency assessment. You had labs drawn and those results were reviewed with you. Please take a copy of your lab work with you to your next primary care provider appointment.    Symptoms to Report:  If you experience more anxiety, confusion, sleeplessness, deep sadness or thoughts of suicide, notify your treatment team or notify your primary care provider. IF ANY OF THE SYMPTOMS YOU ARE EXPERIENCING ARE A MEDICAL EMERGENCY CALL 911 IMMEDIATELY.     Lifestyle Adjustment: Adjust your lifestyle to get enough sleep, relaxation, exercise and good nutrition. Continue to develop healthy coping skills to decrease stress and promote a sober living environment. Do not use mood altering substances including alcohol, illegal drugs or addictive medications other than what is currently prescribed.     Disposition: Home    Facts about COVID19 at www.cdc.gov/COVID19 and www.[place].gov/covid19    Keeping hands clean is one of the most important steps we can take to avoid getting sick and spreading germs to others.  Please wash your hands frequently and lather with soap for at least 20 seconds!    Follow-up Appointment:       1:1 Mental Health Therapy (virtual):  Date: Wednesday, 5/29/2024  Time: 12:00 pm - 1:00 pm  Provider: Alida Oneill  PhD  LP  Location: [facility] [employer] [facility], [address] [place] [address], [facility] [address], [place] [address]  Phone: [phone]  Type: Teletherapy     Patient Instructions  Telehealth/Virtual services only. No in-person services. New patients are contacted via phone/email by CDS office managers (to confirm information), before 1st appointment. Electronic device w/video capabilities required for services. New patients have e-paperwork to complete prior to 1st appointment (access to e-docs given by CDS  during intake). Call CDS w/questions [phone] or email  [email or web address]     Psychiatry/Medication Management (virtual):  Date: Thursday, 5/30/2024  Time: 12:00 pm - 1:00 pm  Provider: Lelo BOWDEN PA-C  Location: [facility], [address] [place] [address], [address], [place] [address]  Phone: [phone]  Type: Telepsychiatry     Patient Instructions  Please fill New Patient Form by using following link. All forms need to be completed 24 hours prior to the appointment date/time by going to [email or web address] Please call us on [phone] 24 hours prior to your scheduled appointment to confirm that you are able to attend. We will provide you information about how to log into video call software when you call.    Recovery apps for your phone for educational purposes and to locate in person and zoom recovery meetings  Everything AA - educational purpose and ronald is a great resource  12 Step Toolkit - educational purpose learning about the 12 steps to recovery  [employer] Cloud - meeting ronald  AA  - meeting ronald  Meeting guide - meeting ronald  Quick NA meeting - meeting ronald  Fernanda- has various apps    Resources:  Some AA/NA meetings are being held online however most have returned to in-person or a hybrid combination please check online to verify time   Need Support Now? If you or someone you know is struggling or in crisis, help is available. Call or text 728 or chat EZ4U.org  AA meetings search for them at: https://aa-intergroup.org (worldwide meeting listings)  AA meetings for [place] area can be found online at: https://a[place].org (click local online meetings listings)  NA meetings for [place] area can be found online at: https://www.nam[place].org  (click find a meeting)  AA and NA Sponsors are excellent resources for support and you can find one at any support group meeting.   Alcoholics Anonymous (https://aa.org/): for information 24 hours/day  AA Intergroup service office in [place]  "(http://www.aastpaul.org/) 849.332.3202  AA Intergroup service office in [facility]: 341.944.8947. (http://www.a[place].org/)  Narcotics Anonymous (www.nam[place].org) (639) 441-6954  https://aafairviewriverside.org/meetings  SMART Recovery - self management for addiction recovery:  www.smartrecovery.org  Pathways ~ A Health Crisis Resource & Support Center:  136.508.8894.  https://prescribetoprevent.org/patient-education/videos/  http://www.harmreduction.org  Crisis Text Line  Text 426001  You will be connected with a trained live crisis counselor to provide support. Por espanol, texto  WANDER a 319951 o texto a 442-AYUDAME en WhatsApp  [place] [facility] Line  1.669.SUICIDE [5091516]  [facility] 049-933-6201  Support Group:  AA/NA and Sponsor/support.  Fast Tracker  Linking people to mental health and substance use disorder resources  Vinted.PeoplePerHour.com   [place] Mental Health Warm Line  Peer to peer support  Monday thru Saturday, 12 pm to 10 pm  704.260.7075 or 0.292.510.4756  Text \"Support\" to 85780  National Kirvin on Mental Illness (KI)  032.112.4392 or 1.888.KI.HELPS  Alcoholics Anonymous (www.alcoholics-anonymous.org): Check your phone book for your local chapter.  Suicide Awareness Voices of Education (SAVE) (www.save.org): 096-692-CIMN (5283)  National Suicide Prevention Line (www.mentalhealthmn.org): 134-634-LZBE (0796)  Mental Health Consumer/Survivor Network of [place] (www.mhcsn.net): 996.240.5153 or 275-011-1910  Mental Health Association of [place] (www.mentalhealth.org): 893.170.8200 or 772-854-8108   Substance Abuse and Mental Health Services (www.samhsa.gov)  [place] Opioid Prevention Coalition: www.opioidcoalition.org    [place] Recovery Connection (MRC)  [facility] connects people seeking recovery to resources that help foster and sustain long-term recovery.  Whether you are seeking resources for treatment, transportation, housing, job training, education, health " care or other pathways to recovery, [facility] is a great place to start.  671.608.9688.  www.[facility].org    Great Pod casts for nutrition and wellness  Listen on Apple Podcasts  Dishing Up Nutrition   Nutritional Weight & Wellness[employer]   Nutrition       Understand the connection between what you eat and how you feel. Hosted by licensed nutritionists and dietitians from [employer]al [employer] Wellness we share practical, real-life solutions for healthier living through nutrition.     General Medication Instructions:   See your medication sheet(s) for instructions.   Take all medications as prescribed.  Make no changes unless your primary care provider suggests them.   Go to all your primary care provider visits.  Be sure to have all your required lab tests. This way, your medicines can be refilled on time.  Do not use any forms of alcohol.    Please Note:  If you have any questions at anytime after you are discharged please call [facility] detox unit 3AW at [phone].  [facility], Behavioral Intake [phone]  Medical Records call [phone]  Outpatient Behavioral Intake call [phone]  LP+ Wait List/Bed Availability call [phone]    Please remember to take all of your behavioral discharge planning and lab paperwork to any follow up appointments, it contains your lab results, diagnosis, medication list and discharge recommendations.      THANK YOU FOR CHOOSING [facility]

## 2024-05-23 NOTE — PROGRESS NOTES
[facility]     Case Management Encounter: Writer met with patient to discuss aftercare plan. Patient reported he was unsure what supports he needs but is considering outpatient CD treatment in the evening and prefers a hybrid in person/virtual program. Patient reported being concerned about his pending food stamps benefits from [facility] and writer encouraged him to call and follow up on the case. Patient agreed to be set up with therapist and psychiatrist for medication management. Patient reports being from up [place] and that he stopped seeing his mental health provider when he moved down here.     Insurance: [employer]       Legal Status: vol     SUDs Assessment Status: may need if unable to set up patient with assessment at outpatient location      ROIs on file: None     Living Situation: Patient reports he lives alone in [place]     Current Providers, Supports & Collateral:  Mom    Current Plan/Referral Status: Outpatient CD program - ideally hybrid virtual/in person in the [facility] - patient can have CD assessment done at outpatient site    Suggestions:     [facility] [place]   Phone: [phone]   Fax: [phone] (for both locations)  [place] location at [address] (second floor) [place] [address]  Groups are Mon-Thurs either 9am-12pm or 6pm-9pm, 1-3 days weekly depending on client needs    [facility] (writer confirmed they have hybrid program)  Many [facility] locations  Call [phone] to schedule assessment and discuss appropriate program    [facility]R s [facility]  [address] [place] [address]  Phone: [phone]  Fax: [phone]  Email: [email or web address]    [address] [place] [address]  Phone: [phone]  Fax: [phone]  Email: [email or web address]      Patient has the following appointments scheduled:     1:1 Mental Health Therapy (virtual):  Date:  Wednesday, 5/29/2024  Time: 12:00 pm - 1:00 pm  Provider: Alida Oneill  PhD  LP  Location: [facility], [address] [place] [address], [address], [place] [address]  Phone: [phone]  Type: Teletherapy    Patient Instructions  Telehealth/Virtual services only. No in-person services. New patients are contacted via phone/email by CDS office managers (to confirm information), before 1st appointment. Electronic device w/video capabilities required for services. New patients have e-paperwork to complete prior to 1st appointment (access to e-docs given by CDS  during intake). Call CDS w/questions [phone] or email [email or web address]    Psychiatry/Medication Management (virtual):  Date: Thursday, 5/30/2024  Time: 12:00 pm - 1:00 pm  Provider: Lelo BOWDEN PA-C  Location: [facility], [address] [place] [address], [address], [place] [address]  Phone: [phone]  Type: Telepsychiatry    Patient Instructions  Please fill New Patient Form by using following link. All forms need to be completed 24 hours prior to the appointment date/time by going to [email or web address] Please call us on [phone] 24 hours prior to your scheduled appointment to confirm that you are able to attend. We will provide you information about how to log into video call software when you call.    RN updated.    Diana Rose  [facility] - Adult Inpatient Addiction Psychiatry Unit

## 2024-05-23 NOTE — PROGRESS NOTES
05/23/24 0136   Patient Belongings   Did you bring any home meds/supplements to the hospital?  No   Patient Belongings locker   Patient Belongings Put in Hospital Secure Location (Security or Locker, etc.) cell phone/electronics;clothing;keys;shoes   Belongings Search Yes   Clothing Search Yes   Second Staff Quentin KNIGHT     Med room bin: Cell Phone, Keys  Storage room bin: pants, hoodie, pair of shoes  A               Admission:  I am responsible for any personal items that are not sent to the safe or pharmacy.  [facility] is not responsible for loss, theft or damage of any property in my possession.    Signature:  _________________________________ Date: _______  Time: _____                                              Staff Signature:  ____________________________ Date: ________  Time: _____      2nd Staff person, if patient is unable/unwilling to sign:    Signature: ________________________________ Date: ________  Time: _____     Discharge:  [place] has returned all of my personal belongings:    Signature: _________________________________ Date: ________  Time: _____                                          Staff Signature:  ____________________________ Date: ________  Time: _____

## 2024-05-23 NOTE — CONSULTS
"[facility]  Consult Note - Hospitalist Service  Date of Admission:  5/22/2024  Consult Requested by: Psychiatry   Reason for Consult: \"Detox\"    Assessment & Plan   Raghavendra Loredo is a 39 year old male with PMH significant for depression, PTSD, anxiety, alcohol use disorder, and history of suicidal ideation with previous suicide attempts who was admitted on 5/22/2024 to inpatient detox for acute alcohol withdrawal.     Acute alcohol withdrawal   Alcohol use disorder   Presented with alcohol breath level of 0.269. no prior history of withdrawal seizures or Dts. Reports drinking 1L vodka daily with last drink 6 hours prior to presentation on 05/22 on 1504h, had been drinking for the past week. Of note, patient has been sober for the past few years, up until the last couple months.   -Management per Psychiatry   -Agreed with thiamine, folate and MVI  -MSSA/CIWA with valium     CASSIUS  MDD  Suicidal ideation  History of suicidal ideation with suicide attempt  Borderline personality disorder: initially presented to ED with suicidal ideations without plan. PTA aripiprazole, hydroxyzine, melatonin, MVI, sertraline     Elevated bilirubin, mild  Elevated to 1.3 on admission. Remainder of LFTs wnl. No abdominal pain, nausea, vomiting. Per chart review, this has been noted previously outside the context of acute alcohol use. No history of Gilbert's noted per chart review, but because this has been noted previously, will recommend recheck as OP    -Repeat LFTs as OP  -Please notify medicine if patient develops any abdominal pain, nausea, vomiting   -Patient will need referral for internal medicine on discharge (placed for you)    HTN: PTA lisinopril 15mg daily (he will need a refill of this on discharge)  Nicotine use disorder: NRT    Resolved:   AGMA, resolved: likely in the setting of acute alcohol use.      The patient's care was discussed with the Bedside Nurse, Patient, and Primary " "team via this note.    Medicine will sign off at this time. Thank you for the consult. Please reach out to medicine if further questions arise.     Clinically Significant Risk Factors Present on Admission             # Anion Gap Metabolic Acidosis: Highest Anion Gap = 29 mmol/L in last 2 days, will monitor and treat as appropriate      # Hypertension: Home medication list includes antihypertensive(s)      # Overweight: Estimated body mass index is 25.85 kg/m  as calculated from the following:    Height as of this encounter: 1.727 m (5' 8\").    Weight as of this encounter: 77.1 kg (170 lb).              Araseli Ware PA-C  Hospitalist Service  Securely message with [employer] (more info)  Text page via [facility] Paging/Directory   ______________________________________________________________________    Chief Complaint   Feeling better     History is obtained from the patient    History of Present Illness   Raghavendra Loredo is a 39 year old male with PMH significant for depression, PTSD, anxiety, alcohol use disorder, and history of suicidal ideation with previous suicide attempts who was admitted on 5/22/2024 to inpatient detox for acute alcohol withdrawal.     Currently denies any withdrawal symptoms.  Denies any tremor, chills, sweats, nausea, vomiting.  Slowly gaining back more appetite.  Denies any hallucinations or altered sensorium.  No chest pain, trouble breathing, cough, abdominal pain.  Notes that he was told in the past that he did have an elevated bilirubin.  Notes he wants to \"get back on the right track and get healthy\".  Does not have a primary care doctor down here, would like to get established with.  Reports that he has not been taking his medications for the the last roughly 3 months.  He ran out of his medications.    Past Medical History    No past medical history on file.    Past Surgical History   No past surgical history on file.    Medications   I have reviewed this patient's current medications "       Review of Systems    The 10 point Review of Systems is negative other than noted in the HPI or here.     Social History   I have reviewed this patient's social history and updated it with pertinent information if needed.         Family History         Allergies   Allergies   Allergen Reactions    Shiitake Mushroom Hives        Physical Exam   Vital Signs: Temp: 98.4  F (36.9  C) Temp src: Oral BP: 131/82 Pulse: 89   Resp: 16 SpO2: 95 % O2 Device: None (Room air)    Weight: 170 lbs 0 oz    General: sitting up in chair, alert, cooperative, awake, in no acute distress  HEENT: normocephalic, atraumatic, anicteric sclera, moist mucus membranes, PERRLA, Eom wnl   Respiratory: breathing comfortably on room air, clear to auscultation bilaterally without wheezing, crackles, or rhonchi appreciated  Cardiac: regular rate and rhythm with normal S1/S2 without murmurs, clicks, rubs or gallops, 2+ radial pulse on LUE, no signs of peripheral edema bilaterally  GI: soft, non-distended, normoactive bowel sounds, non-tender per palpation  Neuro: grossly non-focal, alert and oriented, normal speech  MSK: no bony deformities, moving all extremities independently  Skin: no rashes or lesions on uncovered surfaces, no jaundice    Medical Decision Making       50 MINUTES SPENT BY ME on the date of service doing chart review, history, exam, documentation & further activities per the note.      Data   NOTE: Data reviewed over the past 24 hrs contributes toward MDM complexity

## 2024-05-23 NOTE — ED NOTES
Received call for report from 3A RN Lorri, unit has a bed for the patient, discussed that patient's HR is still elevated and will be giving the Atenolol and notifying provider, will call unit back.

## 2024-05-23 NOTE — PROGRESS NOTES
Behavioral Team Discussion: (5/23/2024)    Continued Stay Criteria/Rationale: Patient admitted for  Alcohol Use Disorder.  Plan: The following services will be provided to the patient; psychiatric assessment, medication management, therapeutic milieu, individual and group support, and skills groups.   Participants: 3A Provider: Dr. Rasta MD; 3A RN: Magdalene Mcgowan RN; 3A CM's: Diana Rose.  Summary/Recommendation: Providers will assess today for treatment recommendations, discharge planning, and aftercare plans. CM will meet with pt for discharge planning.   Medical/Physical: Denies  Precautions:   Behavioral Orders   Procedures    Code 1 - Restrict to Unit    Discontinue 1:1 attendant for suicide risk     Order Specific Question:   I have performed an in person assessment of the patient     Answer:   Based on this assessment the patient no longer requires a one on one attendant at this point in time.     Order Specific Question:   Rationale     Answer:   Modifications to care environment made to mitigate safety risk     Order Specific Question:   Rationale     Answer:   Patient States able to remain safe in hospital    Routine Programming     As clinically indicated    Status 15     Every 15 minutes.    Suicide precautions: Suicide Risk: MODERATE     Search patient belongings according to policy for contraband or items that could be used for self-harm.   Send personal items home or secure valuables according to Patient Belongings policy.  Secure visitor's belongings  Patients able to keep undergarments on after search.  Direct visualization when patient in bathroom.     Order Specific Question:   Suicide Risk     Answer:   MODERATE    Withdrawal precautions     Rationale for change in precautions or plan: N/A  Progress: Initial.

## 2024-05-23 NOTE — PLAN OF CARE
Problem: Suicide Risk  Goal: Absence of Self-Harm  Outcome: Progressing     Problem: Alcohol Withdrawal  Goal: Alcohol Withdrawal Symptom Control  Outcome: Progressing     Problem: Sleep Disturbance  Goal: Adequate Sleep/Rest  Outcome: Progressing   Goal Outcome Evaluation:    Plan of Care Reviewed With: patient      Patient's MSSA scores were 8 and 6. He got Ativan 2 mg, Melatonin and Senna during the night. The Team conducted safety checks every 15 minutes, and there were no issues.

## 2024-05-24 PROCEDURE — 99232 SBSQ HOSP IP/OBS MODERATE 35: CPT | Performed by: NURSE PRACTITIONER

## 2024-05-24 PROCEDURE — 250N000013 HC RX MED GY IP 250 OP 250 PS 637: Performed by: NURSE PRACTITIONER

## 2024-05-24 PROCEDURE — 250N000013 HC RX MED GY IP 250 OP 250 PS 637: Performed by: PSYCHIATRY & NEUROLOGY

## 2024-05-24 PROCEDURE — 128N000004 HC R&B CD ADULT

## 2024-05-24 RX ORDER — GABAPENTIN 300 MG/1
300 CAPSULE ORAL 3 TIMES DAILY PRN
Qty: 60 CAPSULE | Refills: 0 | Status: SHIPPED | OUTPATIENT
Start: 2024-05-24

## 2024-05-24 RX ORDER — DISULFIRAM 250 MG/1
250 TABLET ORAL DAILY
Status: DISCONTINUED | OUTPATIENT
Start: 2024-05-24 | End: 2024-05-24

## 2024-05-24 RX ORDER — MULTIPLE VITAMINS W/ MINERALS TAB 9MG-400MCG
1 TAB ORAL DAILY
Qty: 30 TABLET | Refills: 0 | Status: SHIPPED | OUTPATIENT
Start: 2024-05-24

## 2024-05-24 RX ORDER — LANOLIN ALCOHOL/MO/W.PET/CERES
100 CREAM (GRAM) TOPICAL DAILY
Qty: 30 TABLET | Refills: 0 | Status: SHIPPED | OUTPATIENT
Start: 2024-05-24

## 2024-05-24 RX ORDER — FOLIC ACID 1 MG/1
1 TABLET ORAL DAILY
Qty: 30 TABLET | Refills: 0 | Status: SHIPPED | OUTPATIENT
Start: 2024-05-25

## 2024-05-24 RX ORDER — DISULFIRAM 250 MG/1
250 TABLET ORAL DAILY
Qty: 30 TABLET | Refills: 0 | Status: SHIPPED | OUTPATIENT
Start: 2024-05-25

## 2024-05-24 RX ORDER — DISULFIRAM 250 MG/1
250 TABLET ORAL DAILY
Status: DISCONTINUED | OUTPATIENT
Start: 2024-05-25 | End: 2024-05-25 | Stop reason: HOSPADM

## 2024-05-24 RX ORDER — LISINOPRIL 5 MG/1
15 TABLET ORAL DAILY
Qty: 90 TABLET | Refills: 0 | Status: SHIPPED | OUTPATIENT
Start: 2024-05-25

## 2024-05-24 RX ORDER — TRAZODONE HYDROCHLORIDE 50 MG/1
50 TABLET, FILM COATED ORAL
Qty: 30 TABLET | Refills: 0 | Status: SHIPPED | OUTPATIENT
Start: 2024-05-24

## 2024-05-24 RX ORDER — ARIPIPRAZOLE 2 MG/1
2 TABLET ORAL DAILY
Qty: 30 TABLET | Refills: 0 | Status: SHIPPED | OUTPATIENT
Start: 2024-05-24

## 2024-05-24 RX ADMIN — NICOTINE POLACRILEX 4 MG: 4 GUM, CHEWING BUCCAL at 14:51

## 2024-05-24 RX ADMIN — NICOTINE POLACRILEX 4 MG: 4 GUM, CHEWING BUCCAL at 16:11

## 2024-05-24 RX ADMIN — NICOTINE POLACRILEX 4 MG: 4 GUM, CHEWING BUCCAL at 19:41

## 2024-05-24 RX ADMIN — NICOTINE POLACRILEX 4 MG: 4 GUM, CHEWING BUCCAL at 11:18

## 2024-05-24 RX ADMIN — THIAMINE HCL TAB 100 MG 100 MG: 100 TAB at 08:25

## 2024-05-24 RX ADMIN — SERTRALINE HYDROCHLORIDE 25 MG: 25 TABLET ORAL at 08:29

## 2024-05-24 RX ADMIN — Medication 1 TABLET: at 08:26

## 2024-05-24 RX ADMIN — Medication 5 MG: at 21:14

## 2024-05-24 RX ADMIN — NICOTINE POLACRILEX 4 MG: 4 GUM, CHEWING BUCCAL at 21:14

## 2024-05-24 RX ADMIN — DIAZEPAM 10 MG: 5 TABLET ORAL at 08:26

## 2024-05-24 RX ADMIN — LISINOPRIL 15 MG: 10 TABLET ORAL at 08:26

## 2024-05-24 RX ADMIN — NICOTINE POLACRILEX 4 MG: 4 GUM, CHEWING BUCCAL at 17:59

## 2024-05-24 RX ADMIN — FOLIC ACID 1 MG: 1 TABLET ORAL at 08:25

## 2024-05-24 RX ADMIN — GABAPENTIN 300 MG: 300 CAPSULE ORAL at 20:28

## 2024-05-24 RX ADMIN — NICOTINE POLACRILEX 4 MG: 4 GUM, CHEWING BUCCAL at 08:30

## 2024-05-24 RX ADMIN — NICOTINE POLACRILEX 4 MG: 4 GUM, CHEWING BUCCAL at 13:22

## 2024-05-24 ASSESSMENT — ACTIVITIES OF DAILY LIVING (ADL)
DRESS: INDEPENDENT
ADLS_ACUITY_SCORE: 31
LAUNDRY: UNABLE TO COMPLETE
ADLS_ACUITY_SCORE: 31
HYGIENE/GROOMING: INDEPENDENT
ADLS_ACUITY_SCORE: 31
HYGIENE/GROOMING: INDEPENDENT
ADLS_ACUITY_SCORE: 31
ORAL_HYGIENE: INDEPENDENT

## 2024-05-24 NOTE — PROGRESS NOTES
"[facility], [place]   Psychiatric Progress Note        Interim History:   Team meeting report: The patient's care was discussed with the treatment team during the daily team meeting and/or staff's chart notes were reviewed.  Staff report patient has been calm, pleasant, cooperative.  He is visible in the milieu and social with peers.  He is attending groups.  MSSA scores have been low.  Reported increased anxiety.  The gabapentin was helpful.  Denies everything else.  Ate well.  Med compliant.  Slept through the night.    Met with patient.  States he slept well last night but he woke up a couple of times with nightmare.  Does not know how frequently he has.  Reports high anxiety related to withdrawal symptoms.  Depression is rated as \"some\".  Denies suicidal or homicidal ideation.  Denies any psychotic symptoms including auditory visual hallucinations, paranoia, delusions.  He is eating better.  Discussed disposition.  The patient is not interested in rehab.  He would like to follow-up with an IOP program.  He will be out of detox today but feels that he needs an day.  Tentative discharge will be tomorrow.  Discussed anticraving medications.  The patient wants to restart the Antabuse.  He also needs a letter to his employer that he was hospitalized.  No other questions or concerns.         Medications:     Current Facility-Administered Medications   Medication Dose Route Frequency Provider Last Rate Last Admin    [Held by provider] ARIPiprazole (ABILIFY) tablet 2 mg  2 mg Oral Daily Marcelina Moulton APRN CNP        folic acid (FOLVITE) tablet 1 mg  1 mg Oral Daily Senthil Addison DO   1 mg at 05/24/24 0825    lisinopril (ZESTRIL) tablet 15 mg  15 mg Oral Daily Tigre Ang MD   15 mg at 05/24/24 0826    melatonin tablet 5 mg  5 mg Oral At Bedtime Marcelina Moulton APRN CNP   5 mg at 05/23/24 2126    multivitamin w/minerals (THERA-VIT-M) tablet 1 tablet  1 " tablet Oral Daily Addison, Senthil, DO   1 tablet at 05/24/24 0826    sertraline (ZOLOFT) tablet 25 mg  25 mg Oral Daily Tigre Ang MD   25 mg at 05/24/24 0829    thiamine (B-1) tablet 100 mg  100 mg Oral Daily Senthil Addison, DO   100 mg at 05/24/24 0825            Allergies:     Allergies   Allergen Reactions    Shiitake Mushroom Hives            Labs:     Recent Results (from the past 672 hour(s))   Comprehensive metabolic panel    Collection Time: 05/22/24  3:53 PM   Result Value Ref Range    Sodium 137 135 - 145 mmol/L    Potassium 4.1 3.4 - 5.3 mmol/L    Carbon Dioxide (CO2) 16 (L) 22 - 29 mmol/L    Anion Gap 29 (H) 7 - 15 mmol/L    Urea Nitrogen 15.5 6.0 - 20.0 mg/dL    Creatinine 0.75 0.67 - 1.17 mg/dL    GFR Estimate >90 >60 mL/min/1.73m2    Calcium 8.6 8.6 - 10.0 mg/dL    Chloride 92 (L) 98 - 107 mmol/L    Glucose 70 70 - 99 mg/dL    Alkaline Phosphatase 98 40 - 150 U/L    AST 34 0 - 45 U/L    ALT 28 0 - 70 U/L    Protein Total 8.4 (H) 6.4 - 8.3 g/dL    Albumin 4.9 3.5 - 5.2 g/dL    Bilirubin Total 1.3 (H) <=1.2 mg/dL   Magnesium    Collection Time: 05/22/24  3:53 PM   Result Value Ref Range    Magnesium 2.1 1.7 - 2.3 mg/dL   Phosphorus    Collection Time: 05/22/24  3:53 PM   Result Value Ref Range    Phosphorus 3.5 2.5 - 4.5 mg/dL   CBC with platelets and differential    Collection Time: 05/22/24  3:53 PM   Result Value Ref Range    WBC Count 9.2 4.0 - 11.0 10e3/uL    RBC Count 5.77 4.40 - 5.90 10e6/uL    Hemoglobin 17.3 13.3 - 17.7 g/dL    Hematocrit 48.0 40.0 - 53.0 %    MCV 83 78 - 100 fL    MCH 30.0 26.5 - 33.0 pg    MCHC 36.0 31.5 - 36.5 g/dL    RDW 14.2 10.0 - 15.0 %    Platelet Count 263 150 - 450 10e3/uL    % Neutrophils 68 %    % Lymphocytes 26 %    % Monocytes 6 %    % Eosinophils 0 %    % Basophils 0 %    % Immature Granulocytes 0 %    NRBCs per 100 WBC 0 <1 /100    Absolute Neutrophils 6.3 1.6 - 8.3 10e3/uL    Absolute Lymphocytes 2.4 0.8 - 5.3 10e3/uL    Absolute Monocytes 0.5 0.0 - 1.3  10e3/uL    Absolute Eosinophils 0.0 0.0 - 0.7 10e3/uL    Absolute Basophils 0.0 0.0 - 0.2 10e3/uL    Absolute Immature Granulocytes 0.0 <=0.4 10e3/uL    Absolute NRBCs 0.0 10e3/uL   Alcohol breath test POCT    Collection Time: 05/22/24  3:57 PM   Result Value Ref Range    Alcohol Breath Test 0.269 (A) 0.00 - 0.01   Urine Drug Screen Panel    Collection Time: 05/22/24  4:12 PM   Result Value Ref Range    Amphetamines Urine Screen Negative Screen Negative    Barbituates Urine Screen Negative Screen Negative    Benzodiazepine Urine Screen Negative Screen Negative    Cannabinoids Urine Screen Negative Screen Negative    Cocaine Urine Screen Negative Screen Negative    Fentanyl Qual Urine Screen Negative Screen Negative    Opiates Urine Screen Negative Screen Negative    PCP Urine Screen Negative Screen Negative   Asymptomatic COVID-19 Virus (Coronavirus) by PCR Nasopharyngeal    Collection Time: 05/22/24  5:20 PM    Specimen: Nasopharyngeal; Swab   Result Value Ref Range    SARS CoV2 PCR Negative Negative   GGT    Collection Time: 05/23/24  6:38 AM   Result Value Ref Range    GGT 32 8 - 61 U/L   Lipid panel    Collection Time: 05/23/24  6:38 AM   Result Value Ref Range    Cholesterol 262 (H) <200 mg/dL    Triglycerides 179 (H) <150 mg/dL    Direct Measure HDL 59 >=40 mg/dL    LDL Cholesterol Calculated 167 (H) <=100 mg/dL    Non HDL Cholesterol 203 (H) <130 mg/dL   TSH with free T4 reflex and/or T3 as indicated    Collection Time: 05/23/24  6:38 AM   Result Value Ref Range    TSH 3.12 0.30 - 4.20 uIU/mL   Basic metabolic panel    Collection Time: 05/23/24  6:38 AM   Result Value Ref Range    Sodium 135 135 - 145 mmol/L    Potassium 4.1 3.4 - 5.3 mmol/L    Chloride 96 (L) 98 - 107 mmol/L    Carbon Dioxide (CO2) 25 22 - 29 mmol/L    Anion Gap 14 7 - 15 mmol/L    Urea Nitrogen 21.7 (H) 6.0 - 20.0 mg/dL    Creatinine 0.75 0.67 - 1.17 mg/dL    GFR Estimate >90 >60 mL/min/1.73m2    Calcium 8.8 8.6 - 10.0 mg/dL    Glucose 115  (H) 70 - 99 mg/dL            Precautions:     Behavioral Orders   Procedures    Code 1 - Restrict to Unit    Discontinue 1:1 attendant for suicide risk     Order Specific Question:   I have performed an in person assessment of the patient     Answer:   Based on this assessment the patient no longer requires a one on one attendant at this point in time.     Order Specific Question:   Rationale     Answer:   Modifications to care environment made to mitigate safety risk     Order Specific Question:   Rationale     Answer:   Patient States able to remain safe in hospital    Routine Programming     As clinically indicated    Status 15     Every 15 minutes.    Suicide precautions: Suicide Risk: MODERATE     Search patient belongings according to policy for contraband or items that could be used for self-harm.   Send personal items home or secure valuables according to Patient Belongings policy.  Secure visitor's belongings  Patients able to keep undergarments on after search.  Direct visualization when patient in bathroom.     Order Specific Question:   Suicide Risk     Answer:   MODERATE    Withdrawal precautions            Psychiatric Examination:   Temp: 97.4  F (36.3  C) Temp src: Temporal BP: (!) 143/101 Pulse: 95   Resp: 16 SpO2: 95 % O2 Device: None (Room air)    Weight is 170 lbs 0 oz  Body mass index is 25.85 kg/m .    Appearance: awake, alert and well groomed  Attitude:  cooperative  Eye Contact:  good  Mood:  anxious, depressed, and better  Affect:  appropriate and in normal range  Speech:  clear, coherent  Psychomotor Behavior:  no evidence of tardive dyskinesia, dystonia, or tics  Throught Process:  logical and goal oriented  Associations:  no loose associations  Thought Content:  no evidence of suicidal ideation or homicidal ideation, no auditory hallucinations present, and no visual hallucinations present  Insight:  fair  Judgement:  fair  Oriented to:  time, person, and place  Attention Span and  Concentration:  fair  Recent and Remote Memory:  fair         Precautions:     Behavioral Orders   Procedures    Code 1 - Restrict to Unit    Discontinue 1:1 attendant for suicide risk     Order Specific Question:   I have performed an in person assessment of the patient     Answer:   Based on this assessment the patient no longer requires a one on one attendant at this point in time.     Order Specific Question:   Rationale     Answer:   Modifications to care environment made to mitigate safety risk     Order Specific Question:   Rationale     Answer:   Patient States able to remain safe in hospital    Routine Programming     As clinically indicated    Status 15     Every 15 minutes.    Suicide precautions: Suicide Risk: MODERATE     Search patient belongings according to policy for contraband or items that could be used for self-harm.   Send personal items home or secure valuables according to Patient Belongings policy.  Secure visitor's belongings  Patients able to keep undergarments on after search.  Direct visualization when patient in bathroom.     Order Specific Question:   Suicide Risk     Answer:   MODERATE    Withdrawal precautions          DIagnoses:   Alcohol use disorder, severe, dependence  Alcohol withdrawals with unspecified complications.  Cannabis use disorder in sustained remission  Tobacco use disorder  Major depressive disorder, recurrent episode, moderate (H)  Generalized anxiety disorder  Borderline personality disorder per history         Plan:   --MSSA with Valium, folic acid, multivitamins, thiamine for alcohol dependency  -- Restart Zoloft, 25 mg every morning for depression and anxiety  --Hold on Abilify until withdrawals are completed.  --Will discuss anticraving medication tomorrow.  --Additional medications include gabapentin, hydroxyzine, nicotine gum, Zofran, thousand.    Lab work:  Lab work was reviewed.  Abnormal results include chloride 96, urea nitrogen 21.7, bilirubin 1.3,  "protein 8.4, cholesterol 262, glucose 115, , non-, triglyceride 179.  U tox was negative.  Alcohol breathalyzer was 0.269.    Consults:   Internal medicine to follow up for medical problems.   Elevated bilirubin, mild  Elevated to 1.3 on admission. Remainder of LFTs wnl. No abdominal pain, nausea, vomiting. Per chart review, this has been noted previously outside the context of acute alcohol use. No history of Gilbert's noted per chart review, but because this has been noted previously, will recommend recheck as OP    -Repeat LFTs as OP  -Please notify medicine if patient develops any abdominal pain, nausea, vomiting   -Patient will need referral for internal medicine on discharge (placed for you)     HTN: PTA lisinopril 15mg daily (he will need a refill of this on discharge)  Nicotine use disorder: NRT     Resolved:   AGMA, resolved: likely in the setting of acute alcohol use.      The patient's care was discussed with the Bedside Nurse, Patient, and Primary team via this note.     Medicine will sign off at this time. Thank you for the consult. Please reach out to medicine if further questions arise.    # Anion Gap Metabolic Acidosis: Highest Anion Gap = 29 mmol/L in last 2 days, will monitor and treat as appropriate      # Hypertension: Home medication list includes antihypertensive(s)      # Overweight: Estimated body mass index is 25.85 kg/m  as calculated from the following:  Height as of this encounter: 1.727 m (5' 8\").  Weight as of this encounter: 77.1 kg (170 lb).        Araseli Ware PA-C  Hospitalist Service  --Care was coordinated with the treatment team.   --The patient was consulted on nature of illness and treatment options.      Disposition Plan   Reason for ongoing admission: requires detoxification from substance that poses a risk of bodily harm during withdrawal period  Discharge location: home with self-care.   Discharge Medications: ordered.  Follow-up Appointments: " scheduled  Legal Status: voluntary   Discharge will be granted once symptoms improved.  Tentative discharge tomorrow.  Marcelina RIVERA, CNP    This note was created with the help of Dragon dictation system. All grammatical/typing errors or context distortion are unintentional and inherent to software.

## 2024-05-24 NOTE — PLAN OF CARE
Problem: Alcohol Withdrawal  Goal: Alcohol Withdrawal Symptom Control  Outcome: Progressing   Goal Outcome Evaluation:    MSSA score of 2, patient did not require medication for alcohol withdrawal and is observed to sleep throughout the noc.

## 2024-05-24 NOTE — PLAN OF CARE
Problem: Adult Behavioral Health Plan of Care  Goal: Plan of Care Review  Recent Flowsheet Documentation  Taken 5/23/2024 1817 by Magdalene Mcgowan, RN  Patient Agreement with Plan of Care: agrees   Goal Outcome Evaluation:    Plan of Care Reviewed With: patient    Patient visible in lounge watching TV with peers, Bp slightly elevated this shift, pt has a dx of HTN and is on Lisinopril 15 mg, every day, pt c/o anxiety, Gabapentin given with slight effect, Nicotine gum administered as needed. Melatonin for sleep. MSSA 6/4. Denies psych symptoms.

## 2024-05-24 NOTE — PLAN OF CARE
Goal Outcome Evaluation:      Plan of Care Reviewed With: patient    Overall Patient Progress: improvingOverall Patient Progress: improving     MSSA 4, 5.  BP's elevated 148/95, 161/101 w/HR 98. HR and BP's were not quite at a place to receive medications to lower. Ate most of dinner and present with peers most of evening. Participated in unit programming.   Rated anxiety 8/10 and depression 6/10. Refused Atarax to help mitigate anxiety. Often requesting nicotine gum throughout evening. Denied any psych symptoms and doris for safety.

## 2024-05-24 NOTE — CARE PLAN
Raghavendra has been out in the unge most time this shift. He reported that he was having increasing anxiety 6/10, tremor and restlessness. MSSA 10 & 7, 10mg Valium was given at 08:26.   He has a discharge order for tomorrow. Later  he requested to be discharged this evening since he feels better than he was in the morning. He was reminded that the discussed discharge plan is for tomorrow. He is okay with the plan.   He denied si/hi/hallucinations and contracts for safety.

## 2024-05-25 VITALS
SYSTOLIC BLOOD PRESSURE: 137 MMHG | DIASTOLIC BLOOD PRESSURE: 92 MMHG | HEART RATE: 81 BPM | RESPIRATION RATE: 18 BRPM | BODY MASS INDEX: 25.76 KG/M2 | HEIGHT: 68 IN | TEMPERATURE: 99.9 F | OXYGEN SATURATION: 96 % | WEIGHT: 170 LBS

## 2024-05-25 PROCEDURE — 99238 HOSP IP/OBS DSCHRG MGMT 30/<: CPT | Performed by: NURSE PRACTITIONER

## 2024-05-25 PROCEDURE — 250N000013 HC RX MED GY IP 250 OP 250 PS 637: Performed by: PSYCHIATRY & NEUROLOGY

## 2024-05-25 PROCEDURE — 250N000013 HC RX MED GY IP 250 OP 250 PS 637: Performed by: NURSE PRACTITIONER

## 2024-05-25 RX ADMIN — FOLIC ACID 1 MG: 1 TABLET ORAL at 07:53

## 2024-05-25 RX ADMIN — SERTRALINE HYDROCHLORIDE 25 MG: 25 TABLET ORAL at 07:53

## 2024-05-25 RX ADMIN — Medication 1 TABLET: at 07:53

## 2024-05-25 RX ADMIN — LISINOPRIL 15 MG: 10 TABLET ORAL at 07:52

## 2024-05-25 RX ADMIN — DISULFIRAM 250 MG: 250 TABLET ORAL at 07:53

## 2024-05-25 RX ADMIN — ARIPIPRAZOLE 2 MG: 2 TABLET ORAL at 07:53

## 2024-05-25 RX ADMIN — THIAMINE HCL TAB 100 MG 100 MG: 100 TAB at 07:53

## 2024-05-25 ASSESSMENT — ACTIVITIES OF DAILY LIVING (ADL)
ADLS_ACUITY_SCORE: 31
ADLS_ACUITY_SCORE: 31
LAUNDRY: WITH SUPERVISION
ADLS_ACUITY_SCORE: 31
ORAL_HYGIENE: INDEPENDENT
DRESS: INDEPENDENT
ADLS_ACUITY_SCORE: 31
HYGIENE/GROOMING: INDEPENDENT

## 2024-05-25 NOTE — PLAN OF CARE
Problem: Alcohol Withdrawal  Goal: Alcohol Withdrawal Symptom Control  Outcome: Progressing   Goal Outcome Evaluation:  MSSA score of 6, patient did not require medication for alcohol withdrawal and is observed to sleep throughout the noc.

## 2024-05-25 NOTE — DISCHARGE SUMMARY
Psychiatric Discharge Summary    Raghavendra Loredo MRN# [id number]   Age: 39 year old YOB: 1985     Date of Admission:  5/22/2024  Date of Discharge:  5/25/2024  Admitting Physician:  Denver Magdaleno MD  Discharge Physician:  NICOLE Vaughn CNP          Event Leading to Hospitalization:   From ED: Raghavendra Loredo is a 39 year old male with a history of borderline personality disorder, CASSIUS, MDD, and SI presents to the emergency department for SI without a plan. Patient reports relapsing on alcohol past week and been drinking 1 L vodka daily with last drink 6 hours prior to arrival. Concerned friend called 911 as patient was feeling depressed and suicidal. Here he feels the alcohol is making his depression worse and that he no longer is suicidal as he samanta up. He is interested in detox. He was last in detox and treatment 6 months ago. He managed to stay sober until past week. He denies using other drugs. He is prescribed psychotropics for his depression and anxiety but has not been taking them since his week long alcohol binge.     Raghavendra Loredo is a 39 year old male with history of alcohol and tobacco use disorder, depression, anxiety, borderline personality disorder.  The patient is a good historian.  States the reason for admission is alcohol detox.  States.  States he is doing very well in his life until recently.  He was able to get his life back together.  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  He was in good terms with his girlfriend and their 3-year-old son.  He decided that he wants to celebrate success and relapsed after being sober for 4 months.  He started drinking about a week ago, approximately 1 L of vodka daily.  Usually starts drinking in the afternoon.  He drinks until he passes out.  The patient denies history of seizures and DTs.  The patient started drinking at age 22 and became a problem about 20 years ago.  His longest sobriety has been 6 or 7 years.  He has been in rehab 3  "times.  The patient reports quit vaping tobacco about a month ago.  He is still using nicotine gum.  He has a history of abusing cannabis in the past.  He denies abusing any other substances.  Currently not interested in rehab because he needs to work.  He is interested in IOP program.    The patient reports history of depression, anxiety, and borderline personality disorder.  He reports stopping his medications about a month ago.  He was recently started on Abilify, melatonin, Zoloft.  Currently does not have a psychiatrist or therapist.  The patient feels depressed.  He is sleeping 2 to 3 hours a night.  He does not take naps during the day.  Energy is low.  Appetite is \"fairly good\".  Denies suicidal or homicidal ideation.  Feels hopeless and helpless.  Anxiety comes and goes.  He is having history of panic attacks but not recently.  He denies lashonda, psychosis, PTSD, OCD, and eating disorders.  The patient reports a history of borderline personality disorder.  He has attempted suicide 3 times the last one in January by drinking to death.  Denies SIB. Denies seizures, head injuries, and loss of consciousness.       See Admission note by NICOLE Vaughn CNP for additional details.          Diagnoses:     Alcohol use disorder, severe, dependence  Alcohol withdrawals with unspecified complications.  Cannabis use disorder in sustained remission  Tobacco use disorder  Major depressive disorder, recurrent episode, moderate (H)  Generalized anxiety disorder  Borderline personality disorder per history    Clinically Significant Risk Factors                         # Overweight: Estimated body mass index is 25.85 kg/m  as calculated from the following:    Height as of this encounter: 1.727 m (5' 8\").    Weight as of this encounter: 77.1 kg (170 lb)., PRESENT ON ADMISSION                   Labs:        Lab Results   Component Value Date     05/23/2024    Lab Results   Component Value Date    CHLORIDE 96 " 05/23/2024    Lab Results   Component Value Date    BUN 21.7 05/23/2024      Lab Results   Component Value Date    POTASSIUM 4.1 05/23/2024    Lab Results   Component Value Date    CO2 25 05/23/2024    Lab Results   Component Value Date    CR 0.75 05/23/2024          Lab Results   Component Value Date    WBC 9.2 05/22/2024    HGB 17.3 05/22/2024    HCT 48.0 05/22/2024    MCV 83 05/22/2024     05/22/2024     Lab Results   Component Value Date    AST 34 05/22/2024    ALT 28 05/22/2024    GGT 32 05/23/2024    ALKPHOS 98 05/22/2024    BILITOTAL 1.3 (H) 05/22/2024     Lab Results   Component Value Date    TSH 3.12 05/23/2024            Consults:   Consultation during this admission received from internal medicine         Hospital Course:   Raghavendra Loredo was admitted to [facility] with attending Marcelina RIVERA CNP, as a voluntary patient. The patient was placed under status 15 (15 minute checks) to ensure patient safety.     The patient completed detox without complications.     Because this patient meets criteria for an Alcohol Use Disorder, I performed the following brief intervention on the date of this note:   1) Expressed concern that the patient is drinking at unhealthy levels known to increase their risk of alcohol related problems   2) Gave feedback linking alcohol use and health, including personalized feedback explaining how alcohol use can interact with their medical and/or psychiatric problems, and with prescribed medications.   3) Advised patient to abstain from using any amount of alcohol     The patient declined to go to rehab.  He was discharged to his home and will follow-up with IOP program.     The patient tolerated medications well. Reported mood symptoms improved the patient was active on the unit. The patient was social, engaged and attended groups. No overt lashonda, confusion or psychosis noted. The patient maintained denial of SI, HI and ELIZABETH. The patient reported minimal depression  and anxiety..Future oriented, feeling hopeful for the future. The patient slept well. Appetite was intact. The patient was compliant with medications and care.     Raghavendra Loredo was released to home. At the time of this encounter, Raghavendra Loredo was determined to not be a danger to himself or others and symptoms did not meet criteria for involuntary hospitalization.      Safety plan, post discharge recommendations and relapse prevention were discussed with the patient. The patient agreed to call 911 or present to ED if symptoms worsen or developed thoughts of suicide, self harm or homicide.  The patient agreed to continue medications and outpatient care.         Discharge Medications:     Current Discharge Medication List        START taking these medications    Details   disulfiram (ANTABUSE) 250 MG tablet Take 1 tablet (250 mg) by mouth daily  Qty: 30 tablet, Refills: 0    Associated Diagnoses: Alcohol use disorder      folic acid (FOLVITE) 1 MG tablet Take 1 tablet (1 mg) by mouth daily  Qty: 30 tablet, Refills: 0    Associated Diagnoses: Alcohol use disorder      gabapentin (NEURONTIN) 300 MG capsule Take 1 capsule (300 mg) by mouth 3 times daily as needed (anxiety)  Qty: 60 capsule, Refills: 0    Associated Diagnoses: Anxiety      !! melatonin 5 MG tablet Take 1 tablet (5 mg) by mouth at bedtime  Qty: 30 tablet, Refills: 0    Associated Diagnoses: Major depressive disorder, recurrent episode, moderate (H); Anxiety      traZODone (DESYREL) 50 MG tablet Take 1 tablet (50 mg) by mouth nightly as needed for sleep (may repeat after 60 minutes)  Qty: 30 tablet, Refills: 0    Associated Diagnoses: Major depressive disorder, recurrent episode, moderate (H); Anxiety       !! - Potential duplicate medications found. Please discuss with provider.        CONTINUE these medications which have CHANGED    Details   ARIPiprazole (ABILIFY) 2 MG tablet Take 1 tablet (2 mg) by mouth daily  Qty: 30 tablet, Refills: 0    Associated  Diagnoses: Major depressive disorder, recurrent episode, moderate (H)      lisinopril (ZESTRIL) 5 MG tablet Take 3 tablets (15 mg) by mouth daily  Qty: 90 tablet, Refills: 0    Associated Diagnoses: Secondary hypertension      multivitamin w/minerals (THERA-VIT-M) tablet Take 1 tablet by mouth daily  Qty: 30 tablet, Refills: 0    Associated Diagnoses: Alcohol use disorder      sertraline (ZOLOFT) 50 MG tablet Take 1 tablet (50 mg) by mouth daily  Qty: 30 tablet, Refills: 0    Associated Diagnoses: Major depressive disorder, recurrent episode, moderate (H); Anxiety      thiamine (B-1) 100 MG tablet Take 1 tablet (100 mg) by mouth daily  Qty: 30 tablet, Refills: 0    Associated Diagnoses: Alcohol use disorder           CONTINUE these medications which have NOT CHANGED    Details   !! melatonin 3 MG tablet Take 6 mg by mouth at bedtime       !! - Potential duplicate medications found. Please discuss with provider.        STOP taking these medications       hydrOXYzine (VISTARIL) 50 MG capsule Comments:   Reason for Stopping:         nicotine polacrilex (NICORETTE) 4 MG gum Comments:   Reason for Stopping:                    Psychiatric Examination:   Appearance:  awake, alert and well groomed  Attitude:  cooperative  Eye Contact:  good  Mood:  anxious, depressed, and minimal  Affect:  appropriate and in normal range  Speech:  clear, coherent  Psychomotor Behavior:  no evidence of tardive dyskinesia, dystonia, or tics  Thought Process:  logical and goal oriented  Associations:  no loose associations  Thought Content:  no evidence of suicidal ideation or homicidal ideation  Insight:  good  Judgment:  intact  Oriented to:  time, person, and place  Attention Span and Concentration:  intact  Recent and Remote Memory:  intact  Language: Able to name objects  Fund of Knowledge: appropriate  Muscle Strength and Tone: normal  Gait and Station: Normal         Discharge Plan:   The following medication changes took place:   --  "Restart Zoloft.  Increase to 50 mg every morning for depression and anxiety  -- Restart Abilify, 2 mg every morning for mood stabilization  --Restart Antabuse, 250 mg every morning for alcohol dependency    Follow up with your outpatient provider/team.  Lab work:  Lab work was reviewed.  Abnormal results include chloride 96, urea nitrogen 21.7, bilirubin 1.3, protein 8.4, cholesterol 262, glucose 115, , non-, triglyceride 179.  U tox was negative.  Alcohol breathalyzer was 0.269.     Consults:   Internal medicine to follow up for medical problems.   Elevated bilirubin, mild  Elevated to 1.3 on admission. Remainder of LFTs wnl. No abdominal pain, nausea, vomiting. Per chart review, this has been noted previously outside the context of acute alcohol use. No history of Gilbert's noted per chart review, but because this has been noted previously, will recommend recheck as OP    -Repeat LFTs as OP  -Please notify medicine if patient develops any abdominal pain, nausea, vomiting   -Patient will need referral for internal medicine on discharge (placed for you)     HTN: PTA lisinopril 15mg daily (he will need a refill of this on discharge)  Nicotine use disorder: NRT     Resolved:   AGMA, resolved: likely in the setting of acute alcohol use.      The patient's care was discussed with the Bedside Nurse, Patient, and Primary team via this note.     Medicine will sign off at this time. Thank you for the consult. Please reach out to medicine if further questions arise.    # Anion Gap Metabolic Acidosis: Highest Anion Gap = 29 mmol/L in last 2 days, will monitor and treat as appropriate      # Hypertension: Home medication list includes antihypertensive(s)      # Overweight: Estimated body mass index is 25.85 kg/m  as calculated from the following:  Height as of this encounter: 1.727 m (5' 8\").  Weight as of this encounter: 77.1 kg (170 lb).          Current Plan/Referral Status: Outpatient CD program - ideally " hybrid virtual/in person in the [facility] - patient can have CD assessment done at outpatient site     Suggestions:      [facility] [place]   Phone: [phone]   Fax: [phone] (for both locations)  [place] location [address] ([address] floor) [place] [address]  Groups are Mon-Thurs either 9am-12pm or 6pm-9pm, 1-3 days weekly depending on client needs     [facility] (writer confirmed they have hybrid program)  [facility] locations  Call [phone] to schedule assessment and discuss appropriate program     [address] [facility]  [address] [place] [address]  Phone: [phone]  Fax: [phone]  Email: [email or web address]  [address] [place] [id number]  Phone: [phone]  Fax: [phone]  Email: [email or web address]        Patient has the following appointments scheduled:      1:1 Mental Health Therapy (virtual):  Date: Wednesday, 5/29/2024  Time: 12:00 pm - 1:00 pm  Provider: Alida Oneill PhD  LP  Location: [facility], [address] [place] [address], [facility] [address], [place] [address]  Phone: [phone]  Type: Teletherapy     Patient Instructions  Telehealth/Virtual services only. No in-person services. New patients are contacted via phone/email by CDS office managers (to confirm information), before 1st appointment. Electronic device w/video capabilities required for services. New patients have e-paperwork to complete prior to 1st appointment (access to e-docs given by CDS  during intake). Call CDS w/questions [phone] or email [email or web address]     Psychiatry/Medication Management (virtual):  Date: Thursday, 5/30/2024  Time: 12:00 pm - 1:00 pm  Provider: Lelo VANEGASC  Location: [facility], [address] [place] [address], [address], [place] [address]  Phone: [phone]  Type: Telepsychiatry     Patient Instructions  Please fill New Patient Form by using following link. All forms need to be completed 24 hours prior to the appointment date/time by going to [email or web address] Please call us on [phone] 24 hours prior to your scheduled appointment to confirm that you are able to attend. We will provide you information about how to log into video call software when you call.       Attestation:  The patient has been seen and evaluated by me,  Marcelina RIVERA, CNP

## 2024-05-25 NOTE — PLAN OF CARE
Goal Outcome Evaluation:    Plan of Care Reviewed With: patient        Pt mostly isolative in his room but came out for meals and medications. Pt denies all mental health psych symptoms and contracted for safety. Pt discharging today and pt stated he is safe at home and he does not have access to gun and other dangerous weapon at home. Pt discharge instruction provided including appointments, medications, BP and when to seek help and pt verbalized understanding. Pt out of detox this morning and scored 3 on MSSA. Pt encouraged to make appointment with PMD for BP follow up and pt verbalized understanding. Pt belongings returned an Pt dropped at [facility] at 1228 pm and a bus token provided to pt.

## 2024-05-25 NOTE — PROVIDER NOTIFICATION
05/25/24 1024   C-SSRS (Daily/Shift Screen)   Q2 Suicidal Thoughts (Since Last Contact) 0-->no   Q3 Have you been thinking about how you might do this? 0-->no   Q4 Suicidal Intent without Specific Plan 0-->no   Q5 Suicide Intent with Specific Plan 0-->no   Q6 Suicide Behavior 0-->no   Level of Risk per Screen no risk indicated   Assess Risk to Self and Maintain Safety   Behavior Management impulse control promoted;behavioral plan developed   Self-Harm Prevention environmental self-harm risks assessed   Enhanced Safety Measures pain management   Promote Psychosocial Wellbeing   Family/Support System Care caregiver stress acknowledged   Sleep/Rest Enhancement comfort measures;noise level reduced;reading promoted;medication;music provided   Supportive Measures active listening utilized;decision-making supported;goal-setting facilitated   Establish Safety Plan and Continuity of Care   Safe Transition Promotion protective factors promoted   Environment of Care Checklist   Potentially harmful objects out of patient reach? yes   Personal belongings secured? yes   Patient dressed in hospital-provided attire only? yes   Plastic bags out of patient reach? yes   Patient care equipment (cords, cables, call bells, lines, and drains) shortened, removed, or accounted for? yes   Potentially toxic materials removed or secured? yes   Sharps container removed or secured? yes   Cabinets secured? yes   Room secured by RN per shift? yes